# Patient Record
Sex: MALE | Race: WHITE | NOT HISPANIC OR LATINO | Employment: FULL TIME | URBAN - METROPOLITAN AREA
[De-identification: names, ages, dates, MRNs, and addresses within clinical notes are randomized per-mention and may not be internally consistent; named-entity substitution may affect disease eponyms.]

---

## 2019-10-11 ENCOUNTER — OFFICE VISIT (OUTPATIENT)
Dept: CARDIOLOGY | Facility: CLINIC | Age: 29
End: 2019-10-11
Payer: COMMERCIAL

## 2019-10-11 VITALS
WEIGHT: 207.25 LBS | DIASTOLIC BLOOD PRESSURE: 73 MMHG | SYSTOLIC BLOOD PRESSURE: 118 MMHG | BODY MASS INDEX: 24.47 KG/M2 | HEART RATE: 104 BPM | HEIGHT: 77 IN

## 2019-10-11 DIAGNOSIS — R00.2 PALPITATION: ICD-10-CM

## 2019-10-11 PROCEDURE — 93000 ELECTROCARDIOGRAM COMPLETE: CPT | Mod: S$GLB,,, | Performed by: INTERNAL MEDICINE

## 2019-10-11 PROCEDURE — 99243 PR OFFICE CONSULTATION,LEVEL III: ICD-10-PCS | Mod: S$GLB,,, | Performed by: INTERNAL MEDICINE

## 2019-10-11 PROCEDURE — 99243 OFF/OP CNSLTJ NEW/EST LOW 30: CPT | Mod: S$GLB,,, | Performed by: INTERNAL MEDICINE

## 2019-10-11 PROCEDURE — 93000 EKG 12-LEAD: ICD-10-PCS | Mod: S$GLB,,, | Performed by: INTERNAL MEDICINE

## 2019-10-11 PROCEDURE — 99999 PR PBB SHADOW E&M-NEW PATIENT-LVL III: CPT | Mod: PBBFAC,,, | Performed by: INTERNAL MEDICINE

## 2019-10-11 PROCEDURE — 99999 PR PBB SHADOW E&M-NEW PATIENT-LVL III: ICD-10-PCS | Mod: PBBFAC,,, | Performed by: INTERNAL MEDICINE

## 2019-10-11 NOTE — LETTER
October 11, 2019      Anne Woodruff MD  [address] [facility] [address]           [facility] - Cardiology  [address]  [place] [address]  Phone: [phone]          Patient: Volodymyr Pacheco   MR Number: [id number]   YOB: 1990   Date of Visit: 10/11/2019       Dear Dr. Anne Woodruff:    Thank you for referring Volodymyr Pacheco to me for evaluation. Attached you will find relevant portions of my assessment and plan of care.    If you have questions, please do not hesitate to call me. I look forward to following Volodymyr Pacheco along with you.    Sincerely,    Sourav Shi MD    Enclosure  CC:  No Recipients    If you would like to receive this communication electronically, please contact [email or web address] or [phone] to request more information on [employer] Link access.    For providers and/or their staff who would like to refer a patient to [facility], please contact us through our one-stop-shop provider referral line, [facility], at [phone].    If you feel you have received this communication in error or would no longer like to receive these types of communications, please e-mail [email or web address]

## 2019-10-11 NOTE — PROGRESS NOTES
"Subjective:    Patient ID:  Volodymyr Pacheco is a 29 y.o. male who presents for evaluation of Palpitations      HPI   The patient is a 29 year old male was noted to have "irregular rhythm"  on her exam. He has noted occasional heart pounding with occasional skip.He has no risk factors , has been a  in the past but does not exercise as much now. He finds work stressful at times and occasional assoicated insomnia. No snoring. Today's EKG reveals mild sinus arrhythmia with no ectopy        The patient is a 29 year old female referred by Dr Saini for evaluation of palpitations.  No results found for: NA, K, CL, CO2, BUN, CREATININE, GLU, HGBA1C, MG, AST, ALT, ALBUMIN, PROT, BILITOT, WBC, HGB, HCT, MCV, PLT, INR, PSA, TSH      No results found for: CHOL, HDL, TRIG    No results found for: LDLCALC    History reviewed. No pertinent past medical history.  No current outpatient medications on file.          Review of Systems   Constitution: Negative for decreased appetite, diaphoresis, fever, malaise/fatigue, weight gain and weight loss.   HENT: Negative for congestion, ear discharge, ear pain and nosebleeds.    Eyes: Negative for blurred vision, double vision and visual disturbance.   Cardiovascular: Negative for chest pain, claudication, cyanosis, dyspnea on exertion, irregular heartbeat, leg swelling, near-syncope, orthopnea, palpitations, paroxysmal nocturnal dyspnea and syncope.   Respiratory: Negative for cough, hemoptysis, shortness of breath, sleep disturbances due to breathing, snoring, sputum production and wheezing.    Endocrine: Negative for polydipsia, polyphagia and polyuria.   Hematologic/Lymphatic: Negative for adenopathy and bleeding problem. Does not bruise/bleed easily.   Skin: Negative for color change, nail changes, poor wound healing and rash.   Musculoskeletal: Negative for muscle cramps and muscle weakness.   Gastrointestinal: Negative for abdominal pain, anorexia, change in bowel habit, " "hematochezia, nausea and vomiting.   Genitourinary: Negative for dysuria, frequency and hematuria.   Neurological: Negative for brief paralysis, difficulty with concentration, excessive daytime sleepiness, dizziness, focal weakness, headaches, light-headedness, seizures, vertigo and weakness.   Psychiatric/Behavioral: Negative for altered mental status and depression.   Allergic/Immunologic: Negative for persistent infections.        Objective:/73   Pulse 104   Ht 6' 5" (1.956 m)   Wt 94 kg (207 lb 3.7 oz)   BMI 24.57 kg/m²             Physical Exam   Constitutional: He is oriented to person, place, and time. He appears well-developed and well-nourished.   HENT:   Head: Normocephalic.   Right Ear: External ear normal.   Left Ear: External ear normal.   Nose: Nose normal.   Inspection of lips, teeth and gums normal   Eyes: Pupils are equal, round, and reactive to light. EOM are normal. No scleral icterus.   Neck: Normal range of motion. Neck supple. No JVD present. No tracheal deviation present. No thyromegaly present.   Cardiovascular: Normal rate, regular rhythm and intact distal pulses. Exam reveals no gallop and no friction rub.   No murmur heard.  Pulses:       Carotid pulses are 2+ on the right side, and 2+ on the left side.       Dorsalis pedis pulses are 2+ on the right side, and 2+ on the left side.        Posterior tibial pulses are 2+ on the right side, and 2+ on the left side.   Pulmonary/Chest: Effort normal and breath sounds normal.   Abdominal: Bowel sounds are normal. He exhibits no distension. There is no hepatosplenomegaly. There is no tenderness. There is no guarding.   Musculoskeletal: Normal range of motion. He exhibits no edema or tenderness.   Lymphadenopathy:   Palpation of neck and groin lymph nodes normal   Neurological: He is alert and oriented to person, place, and time. No cranial nerve deficit. He exhibits normal muscle tone. Coordination normal.   Skin: Skin is dry. "   Palpation of skin normal   Psychiatric: His behavior is normal. Judgment and thought content normal.         Assessment:       1. Palpitation         Plan:       Volodymyr was seen today for palpitations.    Diagnoses and all orders for this visit:    Palpitation - sinus arrythmia  Reassurance  No further evaluation at this time

## 2019-10-14 DIAGNOSIS — R00.2 PALPITATION: Primary | ICD-10-CM

## 2021-05-09 ENCOUNTER — HOSPITAL ENCOUNTER (EMERGENCY)
Facility: OTHER | Age: 31
Discharge: HOME | End: 2021-05-09
Attending: EMERGENCY MEDICINE
Payer: COMMERCIAL

## 2021-05-09 ENCOUNTER — NURSE TRIAGE (OUTPATIENT)
Dept: ADMINISTRATIVE | Facility: CLINIC | Age: 31
End: 2021-05-09

## 2021-05-09 VITALS
BODY MASS INDEX: 25.03 KG/M2 | TEMPERATURE: 98 F | SYSTOLIC BLOOD PRESSURE: 145 MMHG | OXYGEN SATURATION: 98 % | HEART RATE: 90 BPM | HEIGHT: 77 IN | RESPIRATION RATE: 18 BRPM | WEIGHT: 212 LBS | DIASTOLIC BLOOD PRESSURE: 80 MMHG

## 2021-05-09 DIAGNOSIS — R52 PAIN: ICD-10-CM

## 2021-05-09 DIAGNOSIS — S99.921A INJURY OF RIGHT FOOT, INITIAL ENCOUNTER: Primary | ICD-10-CM

## 2021-05-09 DIAGNOSIS — T14.8XXA PUNCTURE WOUND: ICD-10-CM

## 2021-05-09 PROCEDURE — 63600175 PHARM REV CODE 636 W HCPCS: Performed by: EMERGENCY MEDICINE

## 2021-05-09 PROCEDURE — 90715 TDAP VACCINE 7 YRS/> IM: CPT | Performed by: EMERGENCY MEDICINE

## 2021-05-09 PROCEDURE — 25000003 PHARM REV CODE 250: Performed by: EMERGENCY MEDICINE

## 2021-05-09 PROCEDURE — 99284 EMERGENCY DEPT VISIT MOD MDM: CPT | Mod: 25

## 2021-05-09 PROCEDURE — 90471 IMMUNIZATION ADMIN: CPT | Performed by: EMERGENCY MEDICINE

## 2021-05-09 RX ORDER — LEVOFLOXACIN 750 MG/1
750 TABLET ORAL
Status: DISCONTINUED | OUTPATIENT
Start: 2021-05-09 | End: 2021-05-10 | Stop reason: HOSPADM

## 2021-05-09 RX ORDER — CEPHALEXIN 500 MG/1
500 CAPSULE ORAL 4 TIMES DAILY
Qty: 20 CAPSULE | Refills: 0 | Status: SHIPPED | OUTPATIENT
Start: 2021-05-09 | End: 2021-05-14

## 2021-05-09 RX ORDER — LEVOFLOXACIN 500 MG/1
750 TABLET, FILM COATED ORAL DAILY
Qty: 8 TABLET | Refills: 0 | Status: SHIPPED | OUTPATIENT
Start: 2021-05-09 | End: 2021-05-14

## 2021-05-09 RX ORDER — BACITRACIN ZINC 500 UNIT/G
OINTMENT (GRAM) TOPICAL 2 TIMES DAILY
Qty: 30 G | Refills: 0 | Status: SHIPPED | OUTPATIENT
Start: 2021-05-09

## 2021-05-09 RX ADMIN — BACITRACIN ZINC NEOMYCIN SULFATE POLYMYXIN B SULFATE: 400; 3.5; 5 OINTMENT TOPICAL at 10:05

## 2021-05-09 RX ADMIN — CLOSTRIDIUM TETANI TOXOID ANTIGEN (FORMALDEHYDE INACTIVATED), CORYNEBACTERIUM DIPHTHERIAE TOXOID ANTIGEN (FORMALDEHYDE INACTIVATED), BORDETELLA PERTUSSIS TOXOID ANTIGEN (GLUTARALDEHYDE INACTIVATED), BORDETELLA PERTUSSIS FILAMENTOUS HEMAGGLUTININ ANTIGEN (FORMALDEHYDE INACTIVATED), BORDETELLA PERTUSSIS PERTACTIN ANTIGEN, AND BORDETELLA PERTUSSIS FIMBRIAE 2/3 ANTIGEN 0.5 ML: 5; 2; 2.5; 5; 3; 5 INJECTION, SUSPENSION INTRAMUSCULAR at 10:05

## 2021-07-01 ENCOUNTER — OFFICE VISIT (OUTPATIENT)
Dept: OTOLARYNGOLOGY | Facility: CLINIC | Age: 31
End: 2021-07-01
Payer: COMMERCIAL

## 2021-07-01 VITALS
HEART RATE: 78 BPM | TEMPERATURE: 98 F | WEIGHT: 215.63 LBS | DIASTOLIC BLOOD PRESSURE: 97 MMHG | SYSTOLIC BLOOD PRESSURE: 159 MMHG | BODY MASS INDEX: 25.57 KG/M2

## 2021-07-01 DIAGNOSIS — B07.9 VIRAL WARTS, UNSPECIFIED TYPE: Primary | ICD-10-CM

## 2021-07-01 PROCEDURE — 3008F PR BODY MASS INDEX (BMI) DOCUMENTED: ICD-10-PCS | Mod: CPTII,S$GLB,, | Performed by: OTOLARYNGOLOGY

## 2021-07-01 PROCEDURE — 99204 OFFICE O/P NEW MOD 45 MIN: CPT | Mod: S$GLB,,, | Performed by: OTOLARYNGOLOGY

## 2021-07-01 PROCEDURE — 3008F BODY MASS INDEX DOCD: CPT | Mod: CPTII,S$GLB,, | Performed by: OTOLARYNGOLOGY

## 2021-07-01 PROCEDURE — 1126F AMNT PAIN NOTED NONE PRSNT: CPT | Mod: S$GLB,,, | Performed by: OTOLARYNGOLOGY

## 2021-07-01 PROCEDURE — 1126F PR PAIN SEVERITY QUANTIFIED, NO PAIN PRESENT: ICD-10-PCS | Mod: S$GLB,,, | Performed by: OTOLARYNGOLOGY

## 2021-07-01 PROCEDURE — 99204 PR OFFICE/OUTPT VISIT, NEW, LEVL IV, 45-59 MIN: ICD-10-PCS | Mod: S$GLB,,, | Performed by: OTOLARYNGOLOGY

## 2021-07-02 ENCOUNTER — TELEPHONE (OUTPATIENT)
Dept: OTOLARYNGOLOGY | Facility: CLINIC | Age: 31
End: 2021-07-02

## 2021-07-23 ENCOUNTER — PROCEDURE VISIT (OUTPATIENT)
Dept: OTOLARYNGOLOGY | Facility: CLINIC | Age: 31
End: 2021-07-23
Payer: COMMERCIAL

## 2021-07-23 VITALS
BODY MASS INDEX: 25.16 KG/M2 | TEMPERATURE: 98 F | WEIGHT: 212.19 LBS | SYSTOLIC BLOOD PRESSURE: 150 MMHG | DIASTOLIC BLOOD PRESSURE: 78 MMHG | HEART RATE: 93 BPM

## 2021-07-23 DIAGNOSIS — B07.9 VIRAL WARTS, UNSPECIFIED TYPE: Primary | ICD-10-CM

## 2021-07-23 PROCEDURE — 88305 TISSUE EXAM BY PATHOLOGIST: CPT | Performed by: PATHOLOGY

## 2021-07-23 PROCEDURE — 88342 IMHCHEM/IMCYTCHM 1ST ANTB: CPT | Mod: 26,,, | Performed by: PATHOLOGY

## 2021-07-23 PROCEDURE — 30117 REMOVAL OF INTRANASAL LESION: CPT | Mod: S$GLB,,, | Performed by: OTOLARYNGOLOGY

## 2021-07-23 PROCEDURE — 30117 PR EXCIS/DEST INTRANAS LESION; INT APP: ICD-10-PCS | Mod: S$GLB,,, | Performed by: OTOLARYNGOLOGY

## 2021-07-23 PROCEDURE — 88305 TISSUE EXAM BY PATHOLOGIST: CPT | Mod: 26,,, | Performed by: PATHOLOGY

## 2021-07-23 PROCEDURE — 88305 TISSUE EXAM BY PATHOLOGIST: ICD-10-PCS | Mod: 26,,, | Performed by: PATHOLOGY

## 2021-07-23 PROCEDURE — 88342 CHG IMMUNOCYTOCHEMISTRY: ICD-10-PCS | Mod: 26,,, | Performed by: PATHOLOGY

## 2021-07-23 PROCEDURE — 88342 IMHCHEM/IMCYTCHM 1ST ANTB: CPT | Performed by: PATHOLOGY

## 2021-07-28 ENCOUNTER — TELEPHONE (OUTPATIENT)
Dept: OTOLARYNGOLOGY | Facility: CLINIC | Age: 31
End: 2021-07-28

## 2021-07-29 LAB
FINAL PATHOLOGIC DIAGNOSIS: NORMAL
GROSS: NORMAL
Lab: NORMAL
MICROSCOPIC EXAM: NORMAL

## 2021-08-03 ENCOUNTER — OFFICE VISIT (OUTPATIENT)
Dept: OTOLARYNGOLOGY | Facility: CLINIC | Age: 31
End: 2021-08-03
Payer: COMMERCIAL

## 2021-08-03 VITALS
DIASTOLIC BLOOD PRESSURE: 87 MMHG | TEMPERATURE: 98 F | SYSTOLIC BLOOD PRESSURE: 132 MMHG | BODY MASS INDEX: 26.04 KG/M2 | WEIGHT: 219.63 LBS | HEART RATE: 64 BPM

## 2021-08-03 DIAGNOSIS — B07.9 VIRAL WARTS, UNSPECIFIED TYPE: Primary | ICD-10-CM

## 2021-08-03 PROCEDURE — 3008F PR BODY MASS INDEX (BMI) DOCUMENTED: ICD-10-PCS | Mod: CPTII,S$GLB,, | Performed by: OTOLARYNGOLOGY

## 2021-08-03 PROCEDURE — 99024 POSTOP FOLLOW-UP VISIT: CPT | Mod: S$GLB,,, | Performed by: OTOLARYNGOLOGY

## 2021-08-03 PROCEDURE — 1126F PR PAIN SEVERITY QUANTIFIED, NO PAIN PRESENT: ICD-10-PCS | Mod: CPTII,S$GLB,, | Performed by: OTOLARYNGOLOGY

## 2021-08-03 PROCEDURE — 1160F PR REVIEW ALL MEDS BY PRESCRIBER/CLIN PHARMACIST DOCUMENTED: ICD-10-PCS | Mod: CPTII,S$GLB,, | Performed by: OTOLARYNGOLOGY

## 2021-08-03 PROCEDURE — 3079F DIAST BP 80-89 MM HG: CPT | Mod: CPTII,S$GLB,, | Performed by: OTOLARYNGOLOGY

## 2021-08-03 PROCEDURE — 3079F PR MOST RECENT DIASTOLIC BLOOD PRESSURE 80-89 MM HG: ICD-10-PCS | Mod: CPTII,S$GLB,, | Performed by: OTOLARYNGOLOGY

## 2021-08-03 PROCEDURE — 1159F PR MEDICATION LIST DOCUMENTED IN MEDICAL RECORD: ICD-10-PCS | Mod: CPTII,S$GLB,, | Performed by: OTOLARYNGOLOGY

## 2021-08-03 PROCEDURE — 3008F BODY MASS INDEX DOCD: CPT | Mod: CPTII,S$GLB,, | Performed by: OTOLARYNGOLOGY

## 2021-08-03 PROCEDURE — 1160F RVW MEDS BY RX/DR IN RCRD: CPT | Mod: CPTII,S$GLB,, | Performed by: OTOLARYNGOLOGY

## 2021-08-03 PROCEDURE — 3075F PR MOST RECENT SYSTOLIC BLOOD PRESS GE 130-139MM HG: ICD-10-PCS | Mod: CPTII,S$GLB,, | Performed by: OTOLARYNGOLOGY

## 2021-08-03 PROCEDURE — 3075F SYST BP GE 130 - 139MM HG: CPT | Mod: CPTII,S$GLB,, | Performed by: OTOLARYNGOLOGY

## 2021-08-03 PROCEDURE — 1159F MED LIST DOCD IN RCRD: CPT | Mod: CPTII,S$GLB,, | Performed by: OTOLARYNGOLOGY

## 2021-08-03 PROCEDURE — 1126F AMNT PAIN NOTED NONE PRSNT: CPT | Mod: CPTII,S$GLB,, | Performed by: OTOLARYNGOLOGY

## 2021-08-03 PROCEDURE — 99024 PR POST-OP FOLLOW-UP VISIT: ICD-10-PCS | Mod: S$GLB,,, | Performed by: OTOLARYNGOLOGY

## 2022-02-15 DIAGNOSIS — M25.512 LEFT SHOULDER PAIN, UNSPECIFIED CHRONICITY: Primary | ICD-10-CM

## 2022-02-21 ENCOUNTER — HOSPITAL ENCOUNTER (OUTPATIENT)
Dept: RADIOLOGY | Facility: HOSPITAL | Age: 32
Discharge: HOME | End: 2022-02-21
Attending: PHYSICIAN ASSISTANT
Payer: COMMERCIAL

## 2022-02-21 ENCOUNTER — OFFICE VISIT (OUTPATIENT)
Dept: SPORTS MEDICINE | Facility: CLINIC | Age: 32
End: 2022-02-21
Payer: COMMERCIAL

## 2022-02-21 VITALS
DIASTOLIC BLOOD PRESSURE: 77 MMHG | HEART RATE: 87 BPM | SYSTOLIC BLOOD PRESSURE: 127 MMHG | WEIGHT: 221.56 LBS | HEIGHT: 77 IN | BODY MASS INDEX: 26.16 KG/M2

## 2022-02-21 DIAGNOSIS — M25.512 LEFT SHOULDER PAIN, UNSPECIFIED CHRONICITY: ICD-10-CM

## 2022-02-21 DIAGNOSIS — S43.005D SHOULDER DISLOCATION, LEFT, SUBSEQUENT ENCOUNTER: ICD-10-CM

## 2022-02-21 DIAGNOSIS — M25.512 ACUTE PAIN OF LEFT SHOULDER: Primary | ICD-10-CM

## 2022-02-21 PROCEDURE — 3008F BODY MASS INDEX DOCD: CPT | Mod: CPTII,S$GLB,, | Performed by: PHYSICIAN ASSISTANT

## 2022-02-21 PROCEDURE — 1159F MED LIST DOCD IN RCRD: CPT | Mod: CPTII,S$GLB,, | Performed by: PHYSICIAN ASSISTANT

## 2022-02-21 PROCEDURE — 1160F PR REVIEW ALL MEDS BY PRESCRIBER/CLIN PHARMACIST DOCUMENTED: ICD-10-PCS | Mod: CPTII,S$GLB,, | Performed by: PHYSICIAN ASSISTANT

## 2022-02-21 PROCEDURE — 99999 PR PBB SHADOW E&M-EST. PATIENT-LVL III: CPT | Mod: PBBFAC,,, | Performed by: PHYSICIAN ASSISTANT

## 2022-02-21 PROCEDURE — 73030 X-RAY EXAM OF SHOULDER: CPT | Mod: 26,LT,, | Performed by: RADIOLOGY

## 2022-02-21 PROCEDURE — 99204 OFFICE O/P NEW MOD 45 MIN: CPT | Mod: S$GLB,,, | Performed by: PHYSICIAN ASSISTANT

## 2022-02-21 PROCEDURE — 99204 PR OFFICE/OUTPT VISIT, NEW, LEVL IV, 45-59 MIN: ICD-10-PCS | Mod: S$GLB,,, | Performed by: PHYSICIAN ASSISTANT

## 2022-02-21 PROCEDURE — 73030 XR SHOULDER COMPLETE 2 OR MORE VIEWS LEFT: ICD-10-PCS | Mod: 26,LT,, | Performed by: RADIOLOGY

## 2022-02-21 PROCEDURE — 73030 X-RAY EXAM OF SHOULDER: CPT | Mod: TC,PN,LT

## 2022-02-21 PROCEDURE — 1159F PR MEDICATION LIST DOCUMENTED IN MEDICAL RECORD: ICD-10-PCS | Mod: CPTII,S$GLB,, | Performed by: PHYSICIAN ASSISTANT

## 2022-02-21 PROCEDURE — 99999 PR PBB SHADOW E&M-EST. PATIENT-LVL III: ICD-10-PCS | Mod: PBBFAC,,, | Performed by: PHYSICIAN ASSISTANT

## 2022-02-21 PROCEDURE — 3078F DIAST BP <80 MM HG: CPT | Mod: CPTII,S$GLB,, | Performed by: PHYSICIAN ASSISTANT

## 2022-02-21 PROCEDURE — 3074F SYST BP LT 130 MM HG: CPT | Mod: CPTII,S$GLB,, | Performed by: PHYSICIAN ASSISTANT

## 2022-02-21 PROCEDURE — 3078F PR MOST RECENT DIASTOLIC BLOOD PRESSURE < 80 MM HG: ICD-10-PCS | Mod: CPTII,S$GLB,, | Performed by: PHYSICIAN ASSISTANT

## 2022-02-21 PROCEDURE — 3008F PR BODY MASS INDEX (BMI) DOCUMENTED: ICD-10-PCS | Mod: CPTII,S$GLB,, | Performed by: PHYSICIAN ASSISTANT

## 2022-02-21 PROCEDURE — 3074F PR MOST RECENT SYSTOLIC BLOOD PRESSURE < 130 MM HG: ICD-10-PCS | Mod: CPTII,S$GLB,, | Performed by: PHYSICIAN ASSISTANT

## 2022-02-21 PROCEDURE — 1160F RVW MEDS BY RX/DR IN RCRD: CPT | Mod: CPTII,S$GLB,, | Performed by: PHYSICIAN ASSISTANT

## 2022-02-21 NOTE — PROGRESS NOTES
CC: LEFT shoulder pain    Patient is a 31-year-old male who presents today for initial evaluation of left shoulder pain.  Patient reports that last Tuesday February 15th he fell while snowboarding in [place], [place] and injured his left shoulder.  He felt and heard a loud pop and was unable to move his left shoulder then felt his shoulder pop back into place.  Later that day he was putting on his jacket and had his arm overhead when it dislocated again before self reducing.  He was evaluated by a physician in [place] and while he was at his appointment his shoulder dislocated and reduced a 3rd time.  He was provided with a sling for comfort and advised to follow-up with orthopedics when he returned home.  Since then, he has had increased soreness of the left shoulder that is worse with overhead activity, reaching, and trying to sleep on his left side.  He also endorses significant weakness and apprehension to move the shoulder.  There was a short period of numbness in the shoulder after dislocating it, however this has since resolved.  He is right-hand dominant and works at [employer] as a .  Prior to this injury, he had no episodes of instability.    He reports that the pain and weakness is worse with overhead activity. It also bothers him at night.    Is affecting ADLs.  Pain is 9/10 at it's worst.      No past medical history on file.    No past surgical history on file.    No family history on file.      Current Outpatient Medications:     bacitracin 500 unit/gram Oint, Apply topically 2 (two) times daily., Disp: 30 g, Rfl: 0    Review of patient's allergies indicates:   Allergen Reactions    Sulfa (sulfonamide antibiotics)           REVIEW OF SYSTEMS:  Constitution: Negative. Negative for chills, fever and night sweats.   HENT: Negative for congestion and headaches.    Eyes: Negative for blurred vision, left vision loss and right vision loss.   Cardiovascular: Negative for chest pain and syncope.    Respiratory: Negative for cough and shortness of breath.    Endocrine: Negative for polydipsia, polyphagia and polyuria.   Hematologic/Lymphatic: Negative for bleeding problem. Does not bruise/bleed easily.   Skin: Negative for dry skin, itching and rash.   Musculoskeletal: Negative for falls.  Positive for left shoulder pain and muscle weakness.   Gastrointestinal: Negative for abdominal pain and bowel incontinence.   Genitourinary: Negative for bladder incontinence and nocturia.   Neurological: Negative for disturbances in coordination, loss of balance and seizures.   Psychiatric/Behavioral: Negative for depression. The patient does not have insomnia.    Allergic/Immunologic: Negative for hives and persistent infections.      PHYSICAL EXAMINATION:  Vitals:  There were no vitals taken for this visit.   General: The patient is alert and oriented x 3.  Mood is pleasant.  Observation of ears, eyes and nose reveal no gross abnormalities.  No labored breathing observed.  Gait is coordinated. Patient can toe walk and heel walk without difficulty.      LEFT Shoulder / Upper Extremity Exam    OBSERVATION:     Swelling  none  Deformity  none   Discoloration  none   Scapular winging none   Scars   none  Atrophy  none    TENDERNESS / CREPITUS (T/C):          T/C      T/C   Clavicle   -/-  SUPRAspinatus    +/-     AC Jt.    -/-  INFRAspinatus  -/-    SC Jt.    -/-  Deltoid    -/-      G. Tuberosity  +/-  LH BICEP groove  +/-   Acromion:  -/-  Midline Neck   -/-     Scapular Spine -/-  Trapezium   -/-   SMA Scapula  -/-  GH jt. line - post  +/-     Scapulothoracic  -/-         ROM: (* = with pain)  Right shoulder   Left shoulder        AROM (PROM)   AROM (PROM)   FE    170° (175°)     70°* (90°*)     ER at 0°    60°  (65°)    20°*  (30°*)   ER at 90° ABD  90°  (90°)    NA  (30°*)   IR at 90°  ABD   NA  (40°)     NA  (20°*)      IR (spine level)   T10     Sacrum*    STRENGTH: (* = with pain) Right shoulder   Left shoulder     SCAPTION   5/5    3/5*    IR    5/5    4/5*   ER    5/5 4/5*   BICEPS   5/5 4/5*   Deltoid    5/5 4/5*     SIGNS:  Painful side       NEER   +    OROMAINES  +    LAGUNA   +    SPEEDS  +     DROP ARM   -   BELLY PRESS +   Superior escape none    LIFT-OFF  deferred   X-Body ADD    +    MOVING VALGUS neg        STABILITY TESTING    Right shoulder   Left shoulder    Translation     Anterior  up face     up face    Posterior  up face    up face    Sulcus   < 10mm    < 10 mm     Signs   Apprehension   neg      +       Relocation   no change     +      Jerk test  neg     +    EXTREMITY NEURO-VASCULAR EXAM:    Sensation grossly intact to light touch all dermatomal regions.    DTR 2+ Biceps, Triceps, BR and Negative Warrens sign   Grossly intact motor function at Elbow, Wrist and Hand   Distal pulses radial and ulnar 2+, brisk cap refill, symmetric.      NECK:  Painless FROM and spinous processes non-tender. Negative Spurlings sign.      OTHER FINDINGS:  - scapular dyskinesia    XRAYS left shoulder (2/21/2022):  No fracture dislocation bone destruction seen.  No acute trauma seen.  There may be remote trauma of the inferior glenoid.          ASSESSMENT:     Left shoulder pain  Left shoulder dislocation, subsequent encounter      PLAN:      1. I made the decision to obtain old records of the patient including previous notes and imaging. New imaging was ordered today of the extremity or extremities evaluated. I independently reviewed and interpreted the radiographs and/or MRIs today as well as prior imaging, if available.    2. We discussed at length different treatment options including conservative vs surgical management. These include anti-inflammatories, acetaminophen, rest, ice, heat, formal physical therapy including strengthening and stretching exercises, home exercise programs, dry needling, and finally surgical intervention.      3. Orders placed for MRI arthrogram of left shoulder to evaluate for  possible labral tear and intra-articular loose body.    4. Patient may wear sling as needed for comfort.  Instructed patient to perform pendulums and progressive mobility as tolerated to avoid stiffness.    5. Advised patient to take over-the-counter anti-inflammatories/acetaminophen as needed for pain.    6. Follow up in clinic with MRI arthrogram results.      All questions were answered, patient will contact us for questions or concerns in the interim.      Medical Dictation software was used during the dictation of portions or the entirety of this medical record.  Phonetic or grammatic errors may exist due to the use of this software. For clarification, refer to the author of the document.

## 2022-03-07 ENCOUNTER — HOSPITAL ENCOUNTER (OUTPATIENT)
Dept: RADIOLOGY | Facility: HOSPITAL | Age: 32
Discharge: HOME | End: 2022-03-07
Attending: PHYSICIAN ASSISTANT
Payer: COMMERCIAL

## 2022-03-07 DIAGNOSIS — M25.512 ACUTE PAIN OF LEFT SHOULDER: ICD-10-CM

## 2022-03-07 DIAGNOSIS — S43.005D SHOULDER DISLOCATION, LEFT, SUBSEQUENT ENCOUNTER: ICD-10-CM

## 2022-03-07 PROCEDURE — 25000003 PHARM REV CODE 250: Performed by: PHYSICIAN ASSISTANT

## 2022-03-07 PROCEDURE — 73040 CONTRAST X-RAY OF SHOULDER: CPT | Mod: TC,LT

## 2022-03-07 PROCEDURE — 23350 XR ARTHROGRAM SHOULDER LEFT WITH MRI TO FOLLOW: ICD-10-PCS | Mod: LT,,, | Performed by: RADIOLOGY

## 2022-03-07 PROCEDURE — 23350 INJECTION FOR SHOULDER X-RAY: CPT | Mod: LT,,, | Performed by: RADIOLOGY

## 2022-03-07 PROCEDURE — 25500020 PHARM REV CODE 255: Performed by: PHYSICIAN ASSISTANT

## 2022-03-07 PROCEDURE — 73040 CONTRAST X-RAY OF SHOULDER: CPT | Mod: 26,LT,, | Performed by: RADIOLOGY

## 2022-03-07 PROCEDURE — 73222 MRI JOINT UPR EXTREM W/DYE: CPT | Mod: TC,LT

## 2022-03-07 PROCEDURE — 73040 XR ARTHROGRAM SHOULDER LEFT WITH MRI TO FOLLOW: ICD-10-PCS | Mod: 26,LT,, | Performed by: RADIOLOGY

## 2022-03-07 PROCEDURE — 73222 MRI ARTHROGRAM SHOULDER WITH CONTRAST LEFT: ICD-10-PCS | Mod: 26,LT,, | Performed by: RADIOLOGY

## 2022-03-07 PROCEDURE — A9585 GADOBUTROL INJECTION: HCPCS | Performed by: PHYSICIAN ASSISTANT

## 2022-03-07 PROCEDURE — 73222 MRI JOINT UPR EXTREM W/DYE: CPT | Mod: 26,LT,, | Performed by: RADIOLOGY

## 2022-03-07 RX ORDER — GADOBUTROL 604.72 MG/ML
7 INJECTION INTRAVENOUS
Status: COMPLETED | OUTPATIENT
Start: 2022-03-07 | End: 2022-03-07

## 2022-03-07 RX ORDER — LIDOCAINE HYDROCHLORIDE 10 MG/ML
13 INJECTION INFILTRATION; PERINEURAL ONCE
Status: COMPLETED | OUTPATIENT
Start: 2022-03-07 | End: 2022-03-07

## 2022-03-07 RX ADMIN — LIDOCAINE HYDROCHLORIDE 13 ML: 10 INJECTION, SOLUTION INFILTRATION; PERINEURAL at 10:03

## 2022-03-07 RX ADMIN — IOHEXOL 3 ML: 300 INJECTION, SOLUTION INTRAVENOUS at 10:03

## 2022-03-07 RX ADMIN — GADOBUTROL 7 ML: 604.72 INJECTION INTRAVENOUS at 10:03

## 2022-03-14 ENCOUNTER — OFFICE VISIT (OUTPATIENT)
Dept: SPORTS MEDICINE | Facility: CLINIC | Age: 32
End: 2022-03-14
Payer: COMMERCIAL

## 2022-03-14 VITALS
HEIGHT: 77 IN | DIASTOLIC BLOOD PRESSURE: 89 MMHG | WEIGHT: 215.19 LBS | BODY MASS INDEX: 25.41 KG/M2 | HEART RATE: 76 BPM | SYSTOLIC BLOOD PRESSURE: 135 MMHG

## 2022-03-14 DIAGNOSIS — M25.512 ACUTE PAIN OF LEFT SHOULDER: Primary | ICD-10-CM

## 2022-03-14 DIAGNOSIS — S42.142A GLENOID FRACTURE OF SHOULDER, LEFT, CLOSED, INITIAL ENCOUNTER: ICD-10-CM

## 2022-03-14 DIAGNOSIS — S43.492A BANKART LESION OF LEFT SHOULDER, INITIAL ENCOUNTER: ICD-10-CM

## 2022-03-14 DIAGNOSIS — S42.152A GLENOID FRACTURE OF SHOULDER, LEFT, CLOSED, INITIAL ENCOUNTER: ICD-10-CM

## 2022-03-14 DIAGNOSIS — M24.012 LOOSE BODY IN LEFT SHOULDER: ICD-10-CM

## 2022-03-14 PROCEDURE — 3008F PR BODY MASS INDEX (BMI) DOCUMENTED: ICD-10-PCS | Mod: CPTII,S$GLB,, | Performed by: PHYSICIAN ASSISTANT

## 2022-03-14 PROCEDURE — 1160F RVW MEDS BY RX/DR IN RCRD: CPT | Mod: CPTII,S$GLB,, | Performed by: PHYSICIAN ASSISTANT

## 2022-03-14 PROCEDURE — 3075F PR MOST RECENT SYSTOLIC BLOOD PRESS GE 130-139MM HG: ICD-10-PCS | Mod: CPTII,S$GLB,, | Performed by: PHYSICIAN ASSISTANT

## 2022-03-14 PROCEDURE — 99999 PR PBB SHADOW E&M-EST. PATIENT-LVL III: ICD-10-PCS | Mod: PBBFAC,,, | Performed by: PHYSICIAN ASSISTANT

## 2022-03-14 PROCEDURE — 99214 PR OFFICE/OUTPT VISIT, EST, LEVL IV, 30-39 MIN: ICD-10-PCS | Mod: S$GLB,,, | Performed by: PHYSICIAN ASSISTANT

## 2022-03-14 PROCEDURE — 3075F SYST BP GE 130 - 139MM HG: CPT | Mod: CPTII,S$GLB,, | Performed by: PHYSICIAN ASSISTANT

## 2022-03-14 PROCEDURE — 3079F DIAST BP 80-89 MM HG: CPT | Mod: CPTII,S$GLB,, | Performed by: PHYSICIAN ASSISTANT

## 2022-03-14 PROCEDURE — 99999 PR PBB SHADOW E&M-EST. PATIENT-LVL III: CPT | Mod: PBBFAC,,, | Performed by: PHYSICIAN ASSISTANT

## 2022-03-14 PROCEDURE — 3008F BODY MASS INDEX DOCD: CPT | Mod: CPTII,S$GLB,, | Performed by: PHYSICIAN ASSISTANT

## 2022-03-14 PROCEDURE — 99214 OFFICE O/P EST MOD 30 MIN: CPT | Mod: S$GLB,,, | Performed by: PHYSICIAN ASSISTANT

## 2022-03-14 PROCEDURE — 3079F PR MOST RECENT DIASTOLIC BLOOD PRESSURE 80-89 MM HG: ICD-10-PCS | Mod: CPTII,S$GLB,, | Performed by: PHYSICIAN ASSISTANT

## 2022-03-14 PROCEDURE — 1160F PR REVIEW ALL MEDS BY PRESCRIBER/CLIN PHARMACIST DOCUMENTED: ICD-10-PCS | Mod: CPTII,S$GLB,, | Performed by: PHYSICIAN ASSISTANT

## 2022-03-14 PROCEDURE — 1159F MED LIST DOCD IN RCRD: CPT | Mod: CPTII,S$GLB,, | Performed by: PHYSICIAN ASSISTANT

## 2022-03-14 PROCEDURE — 1159F PR MEDICATION LIST DOCUMENTED IN MEDICAL RECORD: ICD-10-PCS | Mod: CPTII,S$GLB,, | Performed by: PHYSICIAN ASSISTANT

## 2022-03-14 NOTE — PROGRESS NOTES
CC: LEFT shoulder pain    Patient is a 31-year-old male who presents today for follow-up evaluation of left shoulder pain.  He sustained a fall onto his left shoulder while skiing approximately 4 weeks ago which resulted in a shoulder dislocation.  Since then, he has had multiple instability events, for total events.  He states that a few days ago he was leaning onto his left elbow to reach for something when he again dislocated his left shoulder and self reduced.  He continues to have pain with movement of the right shoulder, especially reaching behind his back reaching out to the side, and overhead activity.  He endorses associated weakness of the left side as well.  He has been resting, icing the shoulder and taking over-the-counter anti-inflammatories as needed for pain.  He is here today to discuss MRI results and treatment options moving forward.    Previous HPI (2/21/2022):  Patient is a 31-year-old male who presents today for initial evaluation of left shoulder pain.  Patient reports that last Tuesday February 15th he fell while snowboarding in [place], [place] and injured his left shoulder.  He felt and heard a loud pop and was unable to move his left shoulder then felt his shoulder pop back into place.  Later that day he was putting on his jacket and had his arm overhead when it dislocated again before self reducing.  He was evaluated by a physician in [place] and while he was at his appointment his shoulder dislocated and reduced a 3rd time.  He was provided with a sling for comfort and advised to follow-up with orthopedics when he returned home.  Since then, he has had increased soreness of the left shoulder that is worse with overhead activity, reaching, and trying to sleep on his left side.  He also endorses significant weakness and apprehension to move the shoulder.  There was a short period of numbness in the shoulder after dislocating it, however this has since resolved.  He is right-hand dominant and  "works at [employer] as a .  Prior to this injury, he had no episodes of instability.    He reports that the pain and weakness is worse with overhead activity. It also bothers him at night.    Is affecting ADLs.  Pain is 9/10 at it's worst.      History reviewed. No pertinent past medical history.    History reviewed. No pertinent surgical history.    History reviewed. No pertinent family history.      Current Outpatient Medications:     bacitracin 500 unit/gram Oint, Apply topically 2 (two) times daily., Disp: 30 g, Rfl: 0    Review of patient's allergies indicates:   Allergen Reactions    Sulfa (sulfonamide antibiotics)           REVIEW OF SYSTEMS:  Constitution: Negative. Negative for chills, fever and night sweats.   HENT: Negative for congestion and headaches.    Eyes: Negative for blurred vision, left vision loss and right vision loss.   Cardiovascular: Negative for chest pain and syncope.   Respiratory: Negative for cough and shortness of breath.    Endocrine: Negative for polydipsia, polyphagia and polyuria.   Hematologic/Lymphatic: Negative for bleeding problem. Does not bruise/bleed easily.   Skin: Negative for dry skin, itching and rash.   Musculoskeletal: Negative for falls.  Positive for left shoulder pain and muscle weakness.   Gastrointestinal: Negative for abdominal pain and bowel incontinence.   Genitourinary: Negative for bladder incontinence and nocturia.   Neurological: Negative for disturbances in coordination, loss of balance and seizures.   Psychiatric/Behavioral: Negative for depression. The patient does not have insomnia.    Allergic/Immunologic: Negative for hives and persistent infections.      PHYSICAL EXAMINATION:  Vitals:  /89   Pulse 76   Ht 6' 5" (1.956 m)   Wt 97.6 kg (215 lb 2.7 oz)   BMI 25.52 kg/m²    General: The patient is alert and oriented x 3.  Mood is pleasant.  Observation of ears, eyes and nose reveal no gross abnormalities.  No labored breathing " observed.  Gait is coordinated. Patient can toe walk and heel walk without difficulty.      LEFT Shoulder / Upper Extremity Exam    OBSERVATION:     Swelling  none  Deformity  none   Discoloration  none   Scapular winging none   Scars   none  Atrophy  none    TENDERNESS / CREPITUS (T/C):          T/C      T/C   Clavicle   -/-  SUPRAspinatus    +/-     AC Jt.    -/-  INFRAspinatus  -/-    SC Jt.    -/-  Deltoid    -/-      G. Tuberosity  +/-  LH BICEP groove  +/-   Acromion:  -/-  Midline Neck   -/-     Scapular Spine -/-  Trapezium   -/-   SMA Scapula  -/-  GH jt. line - post  +/-     Scapulothoracic  -/-         ROM: (* = with pain)  Right shoulder   Left shoulder        AROM (PROM)   AROM (PROM)   FE    170° (175°)     130°* (140°*)     ER at 0°    60°  (65°)    40°*  (45°*)   ER at 90° ABD  90°  (90°)    NA  (30°*)   IR at 90°  ABD   NA  (40°)     NA  (20°*)      IR (spine level)   T10     L4*    STRENGTH: (* = with pain) Right shoulder   Left shoulder    SCAPTION   5/5    3/5*    IR    5/5    4/5*   ER    5/5    4/5*   BICEPS   5/5    4/5*   Deltoid    5/5    4/5*     SIGNS:  Painful side       NEER   +    OROMAINES  +    LAGUNA   +    SPEEDS  +     DROP ARM   -   BELLY PRESS +   Superior escape none    LIFT-OFF  deferred   X-Body ADD    +    MOVING VALGUS neg        STABILITY TESTING    Right shoulder   Left shoulder    Translation     Anterior  up face     up face    Posterior  up face    up face    Sulcus   < 10mm    > 10 mm     Signs   Apprehension   neg      +       Relocation   no change     +      Jerk test  neg     +    EXTREMITY NEURO-VASCULAR EXAM:    Sensation grossly intact to light touch all dermatomal regions.    DTR 2+ Biceps, Triceps, BR and Negative Warrens sign   Grossly intact motor function at Elbow, Wrist and Hand   Distal pulses radial and ulnar 2+, brisk cap refill, symmetric.      NECK:  Painless FROM and spinous processes non-tender. Negative Spurlings sign.      OTHER  FINDINGS:  - scapular dyskinesia    XRAYS left shoulder (2/21/2022):  No fracture dislocation bone destruction seen.  No acute trauma seen.  There may be remote trauma of the inferior glenoid.      MRI arthrogram left shoulder (3/7/2022):  Impression:     Previous anterior dislocation with residual mild Hill-Sachs deformity at the humeral head and significant osseous Bankart at the inferior anterior glenoid, please see details above.     The anterior inferior glenoid is torn with associated periosteal sleeve avulsion.      ASSESSMENT:     Left shoulder pain, s/p multiple instability events  Bankart lesion of left shoulder  Glenoid fracture, left  Intra-articular loose chondral body, left        PLAN:      1. I made the decision to obtain old records of the patient including previous notes and imaging. I independently reviewed and interpreted the radiographs and/or MRIs today as well as prior imaging. Reviewed MRI and radiograph results with patient in detail.    2. We discussed at length different treatment options including conservative vs surgical management. These include anti-inflammatories, acetaminophen, rest, ice, heat, formal physical therapy including strengthening and stretching exercises, home exercise programs, dry needling, and finally surgical intervention.      3. After discussion with patient of conservative versus operative treatment options for his shoulder, he is interested in proceeding with surgery.  We will discuss imaging with Dr. Goldstein to formulate a surgical plan which will likely include arthroscopic Bankart/labral repair, intra-articular loose body removal, possible open subpectoral biceps tenodesis, possible ORIF glenoid.    4. Will call patient to establish a surgical date and to set up further appointments.  We will possibly order a CT of left shoulder to further evaluate glenoid fracture for surgical planning.      All questions were answered, patient will contact us for questions  or concerns in the interim.      Medical Dictation software was used during the dictation of portions or the entirety of this medical record.  Phonetic or grammatic errors may exist due to the use of this software. For clarification, refer to the author of the document.

## 2022-03-15 DIAGNOSIS — S42.152A GLENOID FRACTURE OF SHOULDER, LEFT, CLOSED, INITIAL ENCOUNTER: Primary | ICD-10-CM

## 2022-03-15 DIAGNOSIS — S42.142A GLENOID FRACTURE OF SHOULDER, LEFT, CLOSED, INITIAL ENCOUNTER: Primary | ICD-10-CM

## 2022-03-22 ENCOUNTER — HOSPITAL ENCOUNTER (OUTPATIENT)
Dept: RADIOLOGY | Facility: HOSPITAL | Age: 32
Discharge: HOME | End: 2022-03-22
Attending: PHYSICIAN ASSISTANT
Payer: COMMERCIAL

## 2022-03-22 DIAGNOSIS — S42.152A GLENOID FRACTURE OF SHOULDER, LEFT, CLOSED, INITIAL ENCOUNTER: ICD-10-CM

## 2022-03-22 DIAGNOSIS — S42.142A GLENOID FRACTURE OF SHOULDER, LEFT, CLOSED, INITIAL ENCOUNTER: ICD-10-CM

## 2022-03-22 PROCEDURE — 73200 CT UPPER EXTREMITY W/O DYE: CPT | Mod: 26,LT,, | Performed by: RADIOLOGY

## 2022-03-22 PROCEDURE — 73200 CT SHOULDER WITHOUT CONTRAST LEFT: ICD-10-PCS | Mod: 26,LT,, | Performed by: RADIOLOGY

## 2022-03-22 PROCEDURE — 73200 CT UPPER EXTREMITY W/O DYE: CPT | Mod: TC,LT

## 2022-03-29 ENCOUNTER — OFFICE VISIT (OUTPATIENT)
Dept: SPORTS MEDICINE | Facility: CLINIC | Age: 32
End: 2022-03-29
Payer: COMMERCIAL

## 2022-03-29 VITALS
WEIGHT: 208 LBS | BODY MASS INDEX: 24.56 KG/M2 | HEART RATE: 76 BPM | HEIGHT: 77 IN | SYSTOLIC BLOOD PRESSURE: 128 MMHG | DIASTOLIC BLOOD PRESSURE: 80 MMHG

## 2022-03-29 DIAGNOSIS — S42.152A GLENOID FRACTURE OF SHOULDER, LEFT, CLOSED, INITIAL ENCOUNTER: Primary | ICD-10-CM

## 2022-03-29 DIAGNOSIS — S43.492A BANKART LESION OF LEFT SHOULDER, INITIAL ENCOUNTER: ICD-10-CM

## 2022-03-29 DIAGNOSIS — S42.142A GLENOID FRACTURE OF SHOULDER, LEFT, CLOSED, INITIAL ENCOUNTER: Primary | ICD-10-CM

## 2022-03-29 DIAGNOSIS — M25.512 LEFT SHOULDER PAIN, UNSPECIFIED CHRONICITY: ICD-10-CM

## 2022-03-29 DIAGNOSIS — Z01.818 PRE-OP TESTING: Primary | ICD-10-CM

## 2022-03-29 PROCEDURE — 3074F PR MOST RECENT SYSTOLIC BLOOD PRESSURE < 130 MM HG: ICD-10-PCS | Mod: CPTII,S$GLB,, | Performed by: ORTHOPAEDIC SURGERY

## 2022-03-29 PROCEDURE — 3008F PR BODY MASS INDEX (BMI) DOCUMENTED: ICD-10-PCS | Mod: CPTII,S$GLB,, | Performed by: ORTHOPAEDIC SURGERY

## 2022-03-29 PROCEDURE — 1159F PR MEDICATION LIST DOCUMENTED IN MEDICAL RECORD: ICD-10-PCS | Mod: CPTII,S$GLB,, | Performed by: ORTHOPAEDIC SURGERY

## 2022-03-29 PROCEDURE — 3079F DIAST BP 80-89 MM HG: CPT | Mod: CPTII,S$GLB,, | Performed by: ORTHOPAEDIC SURGERY

## 2022-03-29 PROCEDURE — 99999 PR PBB SHADOW E&M-EST. PATIENT-LVL III: CPT | Mod: PBBFAC,,, | Performed by: ORTHOPAEDIC SURGERY

## 2022-03-29 PROCEDURE — 99215 OFFICE O/P EST HI 40 MIN: CPT | Mod: 25,S$GLB,, | Performed by: ORTHOPAEDIC SURGERY

## 2022-03-29 PROCEDURE — 3008F BODY MASS INDEX DOCD: CPT | Mod: CPTII,S$GLB,, | Performed by: ORTHOPAEDIC SURGERY

## 2022-03-29 PROCEDURE — 99215 PR OFFICE/OUTPT VISIT, EST, LEVL V, 40-54 MIN: ICD-10-PCS | Mod: 25,S$GLB,, | Performed by: ORTHOPAEDIC SURGERY

## 2022-03-29 PROCEDURE — 3079F PR MOST RECENT DIASTOLIC BLOOD PRESSURE 80-89 MM HG: ICD-10-PCS | Mod: CPTII,S$GLB,, | Performed by: ORTHOPAEDIC SURGERY

## 2022-03-29 PROCEDURE — 99999 PR PBB SHADOW E&M-EST. PATIENT-LVL III: ICD-10-PCS | Mod: PBBFAC,,, | Performed by: ORTHOPAEDIC SURGERY

## 2022-03-29 PROCEDURE — 3074F SYST BP LT 130 MM HG: CPT | Mod: CPTII,S$GLB,, | Performed by: ORTHOPAEDIC SURGERY

## 2022-03-29 PROCEDURE — 1159F MED LIST DOCD IN RCRD: CPT | Mod: CPTII,S$GLB,, | Performed by: ORTHOPAEDIC SURGERY

## 2022-03-29 NOTE — PROGRESS NOTES
CC: LEFT shoulder pain  HPI:   Patient is sent to clinic for CT reviews. See History below.     Interval Hx:   Patient is a 31-year-old male who presents today for follow-up evaluation of left shoulder pain.  He sustained a fall onto his left shoulder while skiing approximately 4 weeks ago which resulted in a shoulder dislocation.  Since then, he has had multiple instability events, for total events.  He states that a few days ago he was leaning onto his left elbow to reach for something when he again dislocated his left shoulder and self reduced.  He continues to have pain with movement of the right shoulder, especially reaching behind his back reaching out to the side, and overhead activity.  He endorses associated weakness of the left side as well.  He has been resting, icing the shoulder and taking over-the-counter anti-inflammatories as needed for pain.  He is here today to discuss MRI results and treatment options moving forward.    Previous HPI (2/21/2022):  Patient is a 31-year-old male who presents today for initial evaluation of left shoulder pain.  Patient reports that last Tuesday February 15th he fell while snowboarding in [place], [place] and injured his left shoulder.  He felt and heard a loud pop and was unable to move his left shoulder then felt his shoulder pop back into place.  Later that day he was putting on his jacket and had his arm overhead when it dislocated again before self reducing.  He was evaluated by a physician in [place] and while he was at his appointment his shoulder dislocated and reduced a 3rd time.  He was provided with a sling for comfort and advised to follow-up with orthopedics when he returned home.  Since then, he has had increased soreness of the left shoulder that is worse with overhead activity, reaching, and trying to sleep on his left side.  He also endorses significant weakness and apprehension to move the shoulder.  There was a short period of numbness in the shoulder  "after dislocating it, however this has since resolved.  He is right-hand dominant and works at [employer] as a .  Prior to this injury, he had no episodes of instability.    He reports that the pain and weakness is worse with overhead activity. It also bothers him at night.    Is affecting ADLs.  Pain is 9/10 at it's worst.      History reviewed. No pertinent past medical history.    History reviewed. No pertinent surgical history.    History reviewed. No pertinent family history.      Current Outpatient Medications:     bacitracin 500 unit/gram Oint, Apply topically 2 (two) times daily. (Patient not taking: Reported on 3/29/2022), Disp: 30 g, Rfl: 0    Review of patient's allergies indicates:   Allergen Reactions    Sulfa (sulfonamide antibiotics)           REVIEW OF SYSTEMS:  Constitution: Negative. Negative for chills, fever and night sweats.   HENT: Negative for congestion and headaches.    Eyes: Negative for blurred vision, left vision loss and right vision loss.   Cardiovascular: Negative for chest pain and syncope.   Respiratory: Negative for cough and shortness of breath.    Endocrine: Negative for polydipsia, polyphagia and polyuria.   Hematologic/Lymphatic: Negative for bleeding problem. Does not bruise/bleed easily.   Skin: Negative for dry skin, itching and rash.   Musculoskeletal: Negative for falls.  Positive for left shoulder pain and muscle weakness.   Gastrointestinal: Negative for abdominal pain and bowel incontinence.   Genitourinary: Negative for bladder incontinence and nocturia.   Neurological: Negative for disturbances in coordination, loss of balance and seizures.   Psychiatric/Behavioral: Negative for depression. The patient does not have insomnia.    Allergic/Immunologic: Negative for hives and persistent infections.      PHYSICAL EXAMINATION:  Vitals:  /80   Pulse 76   Ht 6' 5" (1.956 m)   Wt 94.3 kg (208 lb)   BMI 24.67 kg/m²    General: The patient is alert and " oriented x 3.  Mood is pleasant.  Observation of ears, eyes and nose reveal no gross abnormalities.  No labored breathing observed.  Gait is coordinated. Patient can toe walk and heel walk without difficulty.      LEFT Shoulder / Upper Extremity Exam    OBSERVATION:     Swelling  none  Deformity  none   Discoloration  none   Scapular winging none   Scars   none  Atrophy  none    TENDERNESS / CREPITUS (T/C):          T/C      T/C   Clavicle   -/-  SUPRAspinatus    +/-     AC Jt.    -/-  INFRAspinatus  -/-    SC Jt.    -/-  Deltoid    -/-      G. Tuberosity  +/-  LH BICEP groove  +/-   Acromion:  -/-  Midline Neck   -/-     Scapular Spine -/-  Trapezium   -/-   SMA Scapula  -/-  GH jt. line - post  +/-     Scapulothoracic  -/-         ROM: (* = with pain)  Right shoulder   Left shoulder        AROM (PROM)   AROM (PROM)   FE    170° (175°)     130°* (140°*)     ER at 0°    60°  (65°)    40°*  (45°*)   ER at 90° ABD  90°  (90°)    NA  (30°*)   IR at 90°  ABD   NA  (40°)     NA  (20°*)      IR (spine level)   T10     L4*    STRENGTH: (* = with pain) Right shoulder   Left shoulder    SCAPTION   5/5    3/5*    IR    5/5    4/5*   ER    5/5    4/5*   BICEPS   5/5    4/5*   Deltoid    5/5    4/5*     SIGNS:  Painful side       NEER   +    OROAMINES  +    LAGUNA   +    SPEEDS  +     DROP ARM   -   BELLY PRESS +   Superior escape none    LIFT-OFF  deferred   X-Body ADD    +    MOVING VALGUS neg        STABILITY TESTING    Right shoulder   Left shoulder    Translation     Anterior  up face     up face    Posterior  up face    up face    Sulcus   < 10mm    > 10 mm     Signs   Apprehension   neg      +       Relocation   no change     +      Jerk test  neg     +    EXTREMITY NEURO-VASCULAR EXAM:    Sensation grossly intact to light touch all dermatomal regions.    DTR 2+ Biceps, Triceps, BR and Negative Warrens sign   Grossly intact motor function at Elbow, Wrist and Hand   Distal pulses radial and ulnar 2+, brisk cap  refill, symmetric.      NECK:  Painless FROM and spinous processes non-tender. Negative Spurlings sign.      OTHER FINDINGS:  - scapular dyskinesia    XRAYS left shoulder (2/21/2022):  No fracture dislocation bone destruction seen.  No acute trauma seen.  There may be remote trauma of the inferior glenoid.      MRI arthrogram left shoulder (3/7/2022):  Impression:     Previous anterior dislocation with residual mild Hill-Sachs deformity at the humeral head and significant osseous Bankart at the inferior anterior glenoid, please see details above.     The anterior inferior glenoid is torn with associated periosteal sleeve avulsion.     CT Shoulder Without Contrast Left  Narrative: EXAMINATION:  CT SHOULDER WITHOUT CONTRAST LEFT    CLINICAL HISTORY:  Glenoid fracture - surgical planning;  Displaced fracture of glenoid cavity of scapula, left shoulder, initial encounter for closed fracture    TECHNIQUE:  Axial 1.25-mm images of the left shoulder obtained without intravenous contrast.  Data submitted for coronal and sagittal reformats.    COMPARISON:  MR arthrogram shoulder 03/07/2022.  Shoulder radiograph 02/21/2022    FINDINGS:  Slightly displaced healing osseous Bankart lesion at the anteroinferior glenoid, resulting in approximately 30% glenoid bone loss (series 200 image 46).  Fracture fragment measures approximately 2.9 x 1.2 x 1.4 cm (series 200, image 48; series 201, image 63).  Slight irregularity of the superolateral humeral head, consistent with mild Hill-Sachs deformity.  Mild posterior decentering of the humeral head with respect to the glenoid, suggesting instability.  Type 1 flat acromial morphology.  Bony mineralization is normal.    Soft tissues are better evaluated on recent MR. Normal muscle bulk.  Impression: Exam obtained for surgical planning.    Constellation of findings consistent with recent anterior glenohumeral dislocation including:    *Healing, slightly displaced osseous Bankart with  approximately 30% glenoid bone loss  *Irregularity of the superolateral humeral head consistent with mild Hill-Sachs deformity.    Electronically signed by resident: Ronnie Muro  Date:    03/23/2022  Time:    07:47    Electronically signed by: Jermaine Ng MD  Date:    03/23/2022  Time:    10:15         ASSESSMENT:     Left shoulder pain, s/p multiple instability events  Bankart lesion of left shoulder  Glenoid fracture, left  Intra-articular loose chondral body, left        PLAN:        Treatment options were discussed with the patient about shoulder.  I reviewed the MRI images with his and what this means for his shoulder.    We discussed both non-operative and operative options for his shoulder and the risks and benefits of each. Time was given for questions to be asked and all concerns were answered.    He would like to go forward with surgery for his shoulder which I think is reasonable.  All specific risks and benefits were reviewed.    These risks include but are not limited to: bleeding, infection, scarring, re-tear of repair, irreparability of the tear, damage to neurovascular structures, damage to cartilage, stiffness, blood clots, pulmonary embolism, swelling, compartment syndrome, need for further surgery, and the risks of anesthesia.      He verbalized her understanding of these risks and wished to proceed with surgery.    Time was spent face-to-face with the patient during this encounter on counseling about treatment options including surgery and coordination of his care for preoperative visits, surgery and post-operative rehab.     The operative plan will be:    left   1. Attempted arthroscopic bankart labral repair and bony bankart repair  2. +/- ORIF glenoid fracture  Possible chondroplasty or debridement  3. Possible biceps tenotomy or open subpectoral biceps tenodesis  4. Possible rotator cuff repair versus debridement    Position: Lateral decubitus    Patient will not  need medical clearance  prior to the pre-operative appointment.    All questions were answered, patient will contact us for questions or concerns in the interim.    The total face-to-face encounter time with this patient was 40 minutes and greater than 50% of  the encounter time was spent counseling the patient and coordinating care. Significant time was also spent educating the patient, giving detail post-visit instructions, and discussing risks and benefits of treatment. Patient also had several specific questions that were answered during the visit today.             All questions were answered, patient will contact us for questions or concerns in the interim.      Medical Dictation software was used during the dictation of portions or the entirety of this medical record.  Phonetic or grammatic errors may exist due to the use of this software. For clarification, refer to the author of the document.

## 2022-03-30 DIAGNOSIS — M19.012 ARTHRITIS OF LEFT ACROMIOCLAVICULAR JOINT: ICD-10-CM

## 2022-03-30 DIAGNOSIS — S43.492A BANKART LESION OF LEFT SHOULDER, INITIAL ENCOUNTER: Primary | ICD-10-CM

## 2022-03-30 DIAGNOSIS — M75.22 BICEPS TENDINITIS OF LEFT UPPER EXTREMITY: ICD-10-CM

## 2022-03-31 ENCOUNTER — TELEPHONE (OUTPATIENT)
Dept: SPORTS MEDICINE | Facility: CLINIC | Age: 32
End: 2022-03-31
Payer: COMMERCIAL

## 2022-03-31 ENCOUNTER — PATIENT MESSAGE (OUTPATIENT)
Dept: PREADMISSION TESTING | Facility: HOSPITAL | Age: 32
End: 2022-03-31
Payer: COMMERCIAL

## 2022-03-31 DIAGNOSIS — S43.492A BANKART LESION OF LEFT SHOULDER, INITIAL ENCOUNTER: Primary | ICD-10-CM

## 2022-03-31 NOTE — ANESTHESIA PAT ROS NOTE
03/31/2022  Volodymyr Pacheco is a 31 y.o., male.  All information is gathered per Chart review via Epic system only.  Pre-op Assessment          Review of Systems  Social:  Non-Smoker, Social Alcohol Use       Planned Procedure: Procedure(s) (LRB):  ARTHROSCOPY, SHOULDER, WITH BANKART REPAIR (Left)  REPAIR, ROTATOR CUFF, ARTHROSCOPIC (Left)  FIXATION, TENDON (Left)  Requested Anesthesia Type:General  Surgeon: Zve Goldstein MD  Service: Orthopedics  Known or anticipated Date of Surgery:4/18/2022  Previous anesthesia records:None on file      2019  Vent. Rate : 068 BPM     Atrial Rate : 068 BPM      P-R Int : 184 ms          QRS Dur : 096 ms       QT Int : 380 ms       P-R-T Axes : 063 039 053 degrees      QTc Int : 404 ms   Normal sinus rhythm with sinus arrhythmia   Normal ECG   No previous ECGs available   Confirmed by ADONIS HUERTA MD ([id number]) on 10/14/2019 4:00:23 PM     6'5  208#

## 2022-03-31 NOTE — TELEPHONE ENCOUNTER
POST OP PT - [facility]     Start PT on 4/21/22        ----- Message from Zuleyka Marrufo MA sent at 3/30/2022  9:45 AM CDT -----  Patient will do PT at     [facility]  [address] [place] [address], [place] [address]    Phone: [phone]  Fax: [phone]    Pre op - 04/14/22    Date of surgery - 04/18/22

## 2022-04-04 ENCOUNTER — TELEPHONE (OUTPATIENT)
Dept: SPORTS MEDICINE | Facility: CLINIC | Age: 32
End: 2022-04-04
Payer: COMMERCIAL

## 2022-04-04 NOTE — TELEPHONE ENCOUNTER
----- Message from Erica Hazel sent at 4/4/2022  3:33 PM CDT -----  Regarding: Surgery R/s  Regarding:Pt called about r/s surgery to 4/25      Can patient be contacted on Mychart:YES       Requesting Call back number:[phone]

## 2022-04-08 ENCOUNTER — TELEPHONE (OUTPATIENT)
Dept: SPORTS MEDICINE | Facility: CLINIC | Age: 32
End: 2022-04-08
Payer: COMMERCIAL

## 2022-04-08 DIAGNOSIS — Z01.818 PRE-OP TESTING: Primary | ICD-10-CM

## 2022-04-09 ENCOUNTER — PATIENT MESSAGE (OUTPATIENT)
Dept: PREADMISSION TESTING | Facility: HOSPITAL | Age: 32
End: 2022-04-09
Payer: COMMERCIAL

## 2022-04-18 ENCOUNTER — ANESTHESIA EVENT (OUTPATIENT)
Dept: SURGERY | Facility: HOSPITAL | Age: 32
End: 2022-04-18
Payer: COMMERCIAL

## 2022-04-18 NOTE — ANESTHESIA PREPROCEDURE EVALUATION
Chart review complete. Patient's medical history reviewed.  OK to proceed at [facility].    Sourav Garcia MD   4/18/2022 04/18/2022  Volodymyr Pacheco is a 31 y.o., male.      Pre-op Assessment    I have reviewed the Patient Summary Reports.     I have reviewed the Nursing Notes. I have reviewed the NPO Status.   I have reviewed the Medications.     Review of Systems  Anesthesia Hx:  No problems with previous Anesthesia  History of prior surgery of interest to airway management or planning: Previous anesthesia: General Denies Family Hx of Anesthesia complications.   Denies Personal Hx of Anesthesia complications.   Social:  Non-Smoker    Hematology/Oncology:  Hematology Normal   Oncology Normal     EENT/Dental:EENT/Dental Normal   Cardiovascular:  Cardiovascular Normal Exercise tolerance: good     Pulmonary:  Pulmonary Normal    Renal/:  Renal/ Normal     Hepatic/GI:  Hepatic/GI Normal    Musculoskeletal:  Musculoskeletal Normal    Neurological:  Neurology Normal    Endocrine:  Endocrine Normal    Dermatological:  Skin Normal    Psych:  Psychiatric Normal           Physical Exam  General: Well nourished, Cooperative and Alert    Airway:  Mallampati: II / II  Mouth Opening: Normal  TM Distance: Normal  Tongue: Normal  Neck ROM: Normal ROM    Dental:  Intact    Chest/Lungs:  Clear to auscultation, Normal Respiratory Rate    Heart:  Rate: Normal  Rhythm: Regular Rhythm  Sounds: Normal    Abdomen:  Normal, Soft, Nontender        Anesthesia Plan  Type of Anesthesia, risks & benefits discussed:    Anesthesia Type: Gen ETT  Intra-op Monitoring Plan: Standard ASA Monitors  Post Op Pain Control Plan: multimodal analgesia and peripheral nerve block  Induction:  IV  Airway Plan: Direct, Post-Induction  Informed Consent: Informed consent signed with the Patient and all parties understand the risks and agree with  anesthesia plan.  All questions answered.   ASA Score: 1    Ready For Surgery From Anesthesia Perspective.     .

## 2022-04-21 ENCOUNTER — OFFICE VISIT (OUTPATIENT)
Dept: SPORTS MEDICINE | Facility: CLINIC | Age: 32
End: 2022-04-21
Payer: COMMERCIAL

## 2022-04-21 VITALS
WEIGHT: 215 LBS | BODY MASS INDEX: 25.39 KG/M2 | DIASTOLIC BLOOD PRESSURE: 82 MMHG | HEART RATE: 78 BPM | HEIGHT: 77 IN | SYSTOLIC BLOOD PRESSURE: 142 MMHG

## 2022-04-21 DIAGNOSIS — S43.492A BANKART LESION OF LEFT SHOULDER, INITIAL ENCOUNTER: Primary | ICD-10-CM

## 2022-04-21 PROCEDURE — 99999 PR PBB SHADOW E&M-EST. PATIENT-LVL III: CPT | Mod: PBBFAC,,, | Performed by: PHYSICIAN ASSISTANT

## 2022-04-21 PROCEDURE — 1160F PR REVIEW ALL MEDS BY PRESCRIBER/CLIN PHARMACIST DOCUMENTED: ICD-10-PCS | Mod: CPTII,S$GLB,, | Performed by: PHYSICIAN ASSISTANT

## 2022-04-21 PROCEDURE — 99499 UNLISTED E&M SERVICE: CPT | Mod: S$GLB,,, | Performed by: PHYSICIAN ASSISTANT

## 2022-04-21 PROCEDURE — 3008F BODY MASS INDEX DOCD: CPT | Mod: CPTII,S$GLB,, | Performed by: PHYSICIAN ASSISTANT

## 2022-04-21 PROCEDURE — 3077F SYST BP >= 140 MM HG: CPT | Mod: CPTII,S$GLB,, | Performed by: PHYSICIAN ASSISTANT

## 2022-04-21 PROCEDURE — 3077F PR MOST RECENT SYSTOLIC BLOOD PRESSURE >= 140 MM HG: ICD-10-PCS | Mod: CPTII,S$GLB,, | Performed by: PHYSICIAN ASSISTANT

## 2022-04-21 PROCEDURE — 1159F PR MEDICATION LIST DOCUMENTED IN MEDICAL RECORD: ICD-10-PCS | Mod: CPTII,S$GLB,, | Performed by: PHYSICIAN ASSISTANT

## 2022-04-21 PROCEDURE — 1160F RVW MEDS BY RX/DR IN RCRD: CPT | Mod: CPTII,S$GLB,, | Performed by: PHYSICIAN ASSISTANT

## 2022-04-21 PROCEDURE — 3008F PR BODY MASS INDEX (BMI) DOCUMENTED: ICD-10-PCS | Mod: CPTII,S$GLB,, | Performed by: PHYSICIAN ASSISTANT

## 2022-04-21 PROCEDURE — 99499 NO LOS: ICD-10-PCS | Mod: S$GLB,,, | Performed by: PHYSICIAN ASSISTANT

## 2022-04-21 PROCEDURE — 99999 PR PBB SHADOW E&M-EST. PATIENT-LVL III: ICD-10-PCS | Mod: PBBFAC,,, | Performed by: PHYSICIAN ASSISTANT

## 2022-04-21 PROCEDURE — 3079F PR MOST RECENT DIASTOLIC BLOOD PRESSURE 80-89 MM HG: ICD-10-PCS | Mod: CPTII,S$GLB,, | Performed by: PHYSICIAN ASSISTANT

## 2022-04-21 PROCEDURE — 1159F MED LIST DOCD IN RCRD: CPT | Mod: CPTII,S$GLB,, | Performed by: PHYSICIAN ASSISTANT

## 2022-04-21 PROCEDURE — 3079F DIAST BP 80-89 MM HG: CPT | Mod: CPTII,S$GLB,, | Performed by: PHYSICIAN ASSISTANT

## 2022-04-21 RX ORDER — NAPROXEN SODIUM 220 MG/1
81 TABLET, FILM COATED ORAL DAILY
Qty: 21 TABLET | Refills: 0 | Status: SHIPPED | OUTPATIENT
Start: 2022-04-21 | End: 2022-06-01

## 2022-04-21 RX ORDER — OXYCODONE AND ACETAMINOPHEN 10; 325 MG/1; MG/1
1 TABLET ORAL EVERY 6 HOURS PRN
Qty: 28 TABLET | Refills: 0 | Status: SHIPPED | OUTPATIENT
Start: 2022-04-21

## 2022-04-21 RX ORDER — TRAMADOL HYDROCHLORIDE 50 MG/1
50 TABLET ORAL EVERY 6 HOURS PRN
Qty: 28 TABLET | Refills: 0 | Status: SHIPPED | OUTPATIENT
Start: 2022-04-21

## 2022-04-21 RX ORDER — SODIUM CHLORIDE 9 MG/ML
INJECTION, SOLUTION INTRAVENOUS CONTINUOUS
Status: CANCELLED | OUTPATIENT
Start: 2022-04-21

## 2022-04-21 RX ORDER — PROMETHAZINE HYDROCHLORIDE 25 MG/1
25 TABLET ORAL EVERY 6 HOURS PRN
Qty: 20 TABLET | Refills: 0 | Status: SHIPPED | OUTPATIENT
Start: 2022-04-21

## 2022-04-22 NOTE — H&P
"Volodymyr Pacheco  is here for a completion of his perioperative paperwork. he  Is scheduled to undergo:    left   1. Attempted arthroscopic bankart labral repair and bony bankart repair  2. +/- ORIF glenoid fracture  Possible chondroplasty or debridement and loose body removal  3. Possible biceps tenotomy or open subpectoral biceps tenodesis  4. Possible rotator cuff repair versus debridement     on 5/2/2022.      He is a healthy individual and does not need clearance for this procedure.     PAST MEDICAL HISTORY: History reviewed. No pertinent past medical history.  PAST SURGICAL HISTORY: History reviewed. No pertinent surgical history.  FAMILY HISTORY: History reviewed. No pertinent family history.  SOCIAL HISTORY:   Social History     Socioeconomic History    Marital status:    Tobacco Use    Smoking status: Never Smoker    Smokeless tobacco: Never Used   Substance and Sexual Activity    Alcohol use: Yes     Comment: rarelt    Drug use: Never       MEDICATIONS:   Current Outpatient Medications:     bacitracin 500 unit/gram Oint, Apply topically 2 (two) times daily. (Patient not taking: No sig reported), Disp: 30 g, Rfl: 0  ALLERGIES:   Review of patient's allergies indicates:   Allergen Reactions    Sulfa (sulfonamide antibiotics)        VITAL SIGNS: BP (!) 142/82   Pulse 78   Ht 6' 5" (1.956 m)   Wt 97.5 kg (215 lb)   BMI 25.50 kg/m²      Risks, indications and benefits of the surgical procedure were discussed with the patient. All questions with regard to surgery, rehab, expected return to functional activities, activities of daily living and recreational endeavors were answered to his satisfaction.    It was explained to the patient that there may be an increase in surgical risks if the patient has certain co-morbidities such as but not limited to: Obesity, Cardiovascular issues (CHF, CAD, Arrhythmias), chronic pulmonary issues, previous or current neurovascular/neurological issues, previous " strokes, diabetes mellitus, previous wound healing issues, previous wound or skin infections, PVD, clotting disorders, if the patient uses chronic steroids, if the patient takes or has immune compromising medications or diseases, or has previously or currently used tobacco products.     The patient verbalized that he/she does not have any additional clotting, bleeding, or blood disorders, other than what is list in her chart on today's review.     Then a brief history and physical exam were performed.    Review of Systems   Constitution: Negative. Negative for chills, fever and night sweats.   HENT: Negative for congestion and headaches.    Eyes: Negative for blurred vision, left vision loss and right vision loss.   Cardiovascular: Negative for chest pain and syncope.   Respiratory: Negative for cough and shortness of breath.    Endocrine: Negative for polydipsia, polyphagia and polyuria.   Hematologic/Lymphatic: Negative for bleeding problem. Does not bruise/bleed easily.   Skin: Negative for dry skin, itching and rash.   Musculoskeletal: Negative for falls and muscle weakness.   Gastrointestinal: Negative for abdominal pain and bowel incontinence.   Genitourinary: Negative for bladder incontinence and nocturia.   Neurological: Negative for disturbances in coordination, loss of balance and seizures.   Psychiatric/Behavioral: Negative for depression. The patient does not have insomnia.    Allergic/Immunologic: Negative for hives and persistent infections.     PHYSICAL EXAM:  GEN: A&Ox3, WD WN NAD  HEENT: WNL  CHEST: CTAB, no W/R/R  HEART: RRR, no M/R/G  ABD: Soft, NT ND, BS x4 QUADS  MS; See Epic  NEURO: CN II-XII intact       The surgical consent was then reviewed with the patient, who agreed with all the contents of the consent form and it was signed. he was then given the [facility] surgery packet to bring with him to surgery for the anesthesia portion of his perioperative paperwork.   For all physicians  except for Dr. Goldstein, we will email and possibly fax the consent forms and booking sheets to [facility] pre-admit.    The patient was given the opportunity to ask questions about the surgical plan and consent form, and once no other questions were asked, I proceeded with the pre-op appointment.    PHYSICAL THERAPY:  He was also instructed regarding physical therapy and will begin on  POD#5. He was given a copy of the original prescription to schedule. Another copy of this prescription was also faxed to [place] [facility].    POST OP CARE:instructions were reviewed including care of the wound and dressing after surgery and when he can shower.     CRUTCHES OR WALKER: It was explained to the patient that if they are having a lower extremity surgery that they will require either a walker or crutches to ambulate safely with after surgery. It was explained that a cane or other assistive devices are not sufficient to safely ambulate with after surgery. I explained to the patient that I will place an order for them to receive either crutches or a walker after surgery to go home with. It was explained that if they have crutches or a walker at home already, that they are REQUIRED to bring them to the hospital on the day of surgery. It was explained that if they do not have them at the hospital on the day of surgery that they WILL be provided a new pair or crutches or a walker to go home with to ensure ambulation will be safe if the patient needs to stop somewhere on the way home.      PAIN MANAGEMENT: Volodymyr Pacheco was also given their pain management regimen, which includes the TENS unit given to him by [facility] along with the education required for its use. He was also instructed regarding the Polar ice unit that will be in place after surgery and his postoperative pain medications.     PAIN MEDICATION:  Percocet 10/325mg 1 po q 4-6 hours prn pain  Ultram 50 mg Take 1-2 p.o. q.6 hours p.r.n.  breakthrough pain,   Phenergan 25 mg one p.o. q.6 hours p.r.n. nausea and vomiting.  Aspirin 81 mg one p.o. QD x 3 weeks for DVT prophylaxis    Post op meds to be delivered bedside prior to discharge. Deliver to family if patient is in surgery at 5pm.    The patient was told that narcotic pain medications may make them drowsy and instructions were given to not sign legal documents, drive or operate heavy machinery, cars, or equipment while under the influence of narcotic medications. The patient was told and understands that narcotic pain medications should only be used as needed to control pain and that other options of pain control include TENs unit and ice packs/unit.     Patient was instructed to purchase and take Colace to counter possible GI side effects of taking opiates.     DVT prophylaxis was discussed with the patient today including risk factors for developing DVTs and history of DVTs. The patient was asked if any specific recommendations were given from the doctor/s that did pre-operative surgical clearance. The patient was then given an education sheet about DVTs and PE with warning signs and symptoms of both and steps to take if they suspect either of these.    Patient was asked if they were taking or using OCP pills or devices. If they answered yes, then they were instructed to stop using OCPs at this pre-operative appointment until 2 months post-op to help prevent DVT development. They understand that there are other forms of birth control that do not involve hormones. They expressed understanding that ignoring/not following this instruction could result in a DVT which could turn into a deadly pulmonary embolism.     This along with the Modified Caprini risk assessment model for VTE in general surgical patients was used to determine the patient's DVT risk.     From: Anita RAMON, Stepan DA, Lucia SM, et al. Prevention of VTE in nonorthopedic surgical patients: antithrombotic therapy and prevention of  thrombosis, 9th ed: American College of Chest Physicians evidence-based clinical practical guidelines. Chest 2012; 141:e227S. Copyright © 2012. Reproduced with permission from the American College of Chest Physicians.    The below listed DVT prophylaxis regimen was discussed along with SCDs during surgery and bilateral KALEY compression stockings to be used post-op. Length of treatment has been determined to be 10-42 days post-op by the above noted Caprini assessment model. Early ambulation post-op was also discussed and emphasized with the patient.     The patient was instructed to buy and take:  Aspirin 81mg QD x 3 weeks for DVT prophylaxis starting on the morning after surgery.  Patient will also use bilateral TEDs on lower extremities, SCDs during surgery, and early ambulation post-op. If the patient was previously taking 81mg baby aspirin, they were told to not take it starting 5 days prior to surgery and to restart the 81mg aspirin after surgery.       Patient was also told to buy over the counter Prilosec medication if needed and take it once daily for GI protection as long as they are taking NSAIDs or Aspirin.      Patient denies history of seizures.     I explained to following and the patient expressed understanding:  The patient is currently aware of the COVID19 pandemic and that proceeding with their surgical procedure could potentially increase exposure to coronavirus in the community. The patient understands that there is the possibility of delayed or cancelled appts or PT visits in the future. They understand that infection with the coronavirus could complicate their surgery recovery. They are aware of the current policies and procedures of [facility] and the government regarding the pandemic and they were given the option of delaying my surgery. The patient elects to proceed with surgery at this time.       The patient has been scheduled to undergo coronavirus testing on the day prior to surgery.     The  patient was instructed to practice strict social distancing, hand washing/hygiene, respiratory hygiene, and cough etiquette from now until 6 weeks following surgery to reduce the risk of yuliana coronavirus.    As there were no other questions to be asked, he was given my business card along with Zev Goldstein MD business card if he has any questions or concerns prior to surgery or in the postop period.

## 2022-04-22 NOTE — H&P (VIEW-ONLY)
"Volodymyr Pacheco  is here for a completion of his perioperative paperwork. he  Is scheduled to undergo:    left   1. Attempted arthroscopic bankart labral repair and bony bankart repair  2. +/- ORIF glenoid fracture  Possible chondroplasty or debridement and loose body removal  3. Possible biceps tenotomy or open subpectoral biceps tenodesis  4. Possible rotator cuff repair versus debridement     on 5/2/2022.      He is a healthy individual and does not need clearance for this procedure.     PAST MEDICAL HISTORY: History reviewed. No pertinent past medical history.  PAST SURGICAL HISTORY: History reviewed. No pertinent surgical history.  FAMILY HISTORY: History reviewed. No pertinent family history.  SOCIAL HISTORY:   Social History     Socioeconomic History    Marital status:    Tobacco Use    Smoking status: Never Smoker    Smokeless tobacco: Never Used   Substance and Sexual Activity    Alcohol use: Yes     Comment: rarelt    Drug use: Never       MEDICATIONS:   Current Outpatient Medications:     bacitracin 500 unit/gram Oint, Apply topically 2 (two) times daily. (Patient not taking: No sig reported), Disp: 30 g, Rfl: 0  ALLERGIES:   Review of patient's allergies indicates:   Allergen Reactions    Sulfa (sulfonamide antibiotics)        VITAL SIGNS: BP (!) 142/82   Pulse 78   Ht 6' 5" (1.956 m)   Wt 97.5 kg (215 lb)   BMI 25.50 kg/m²      Risks, indications and benefits of the surgical procedure were discussed with the patient. All questions with regard to surgery, rehab, expected return to functional activities, activities of daily living and recreational endeavors were answered to his satisfaction.    It was explained to the patient that there may be an increase in surgical risks if the patient has certain co-morbidities such as but not limited to: Obesity, Cardiovascular issues (CHF, CAD, Arrhythmias), chronic pulmonary issues, previous or current neurovascular/neurological issues, previous " strokes, diabetes mellitus, previous wound healing issues, previous wound or skin infections, PVD, clotting disorders, if the patient uses chronic steroids, if the patient takes or has immune compromising medications or diseases, or has previously or currently used tobacco products.     The patient verbalized that he/she does not have any additional clotting, bleeding, or blood disorders, other than what is list in her chart on today's review.     Then a brief history and physical exam were performed.    Review of Systems   Constitution: Negative. Negative for chills, fever and night sweats.   HENT: Negative for congestion and headaches.    Eyes: Negative for blurred vision, left vision loss and right vision loss.   Cardiovascular: Negative for chest pain and syncope.   Respiratory: Negative for cough and shortness of breath.    Endocrine: Negative for polydipsia, polyphagia and polyuria.   Hematologic/Lymphatic: Negative for bleeding problem. Does not bruise/bleed easily.   Skin: Negative for dry skin, itching and rash.   Musculoskeletal: Negative for falls and muscle weakness.   Gastrointestinal: Negative for abdominal pain and bowel incontinence.   Genitourinary: Negative for bladder incontinence and nocturia.   Neurological: Negative for disturbances in coordination, loss of balance and seizures.   Psychiatric/Behavioral: Negative for depression. The patient does not have insomnia.    Allergic/Immunologic: Negative for hives and persistent infections.     PHYSICAL EXAM:  GEN: A&Ox3, WD WN NAD  HEENT: WNL  CHEST: CTAB, no W/R/R  HEART: RRR, no M/R/G  ABD: Soft, NT ND, BS x4 QUADS  MS; See Epic  NEURO: CN II-XII intact       The surgical consent was then reviewed with the patient, who agreed with all the contents of the consent form and it was signed. he was then given the [facility] surgery packet to bring with him to surgery for the anesthesia portion of his perioperative paperwork.   For all physicians  except for Dr. Goldstein, we will email and possibly fax the consent forms and booking sheets to [facility] pre-admit.    The patient was given the opportunity to ask questions about the surgical plan and consent form, and once no other questions were asked, I proceeded with the pre-op appointment.    PHYSICAL THERAPY:  He was also instructed regarding physical therapy and will begin on  POD#5. He was given a copy of the original prescription to schedule. Another copy of this prescription was also faxed to [facility].    POST OP CARE:instructions were reviewed including care of the wound and dressing after surgery and when he can shower.     CRUTCHES OR WALKER: It was explained to the patient that if they are having a lower extremity surgery that they will require either a walker or crutches to ambulate safely with after surgery. It was explained that a cane or other assistive devices are not sufficient to safely ambulate with after surgery. I explained to the patient that I will place an order for them to receive either crutches or a walker after surgery to go home with. It was explained that if they have crutches or a walker at home already, that they are REQUIRED to bring them to the hospital on the day of surgery. It was explained that if they do not have them at the hospital on the day of surgery that they WILL be provided a new pair or crutches or a walker to go home with to ensure ambulation will be safe if the patient needs to stop somewhere on the way home.      PAIN MANAGEMENT: Volodymyr Pacheco was also given their pain management regimen, which includes the TENS unit given to him by [facility] along with the education required for its use. He was also instructed regarding the Polar ice unit that will be in place after surgery and his postoperative pain medications.     PAIN MEDICATION:  Percocet 10/325mg 1 po q 4-6 hours prn pain  Ultram 50 mg Take 1-2 p.o. q.6 hours p.r.n.  breakthrough pain,   Phenergan 25 mg one p.o. q.6 hours p.r.n. nausea and vomiting.  Aspirin 81 mg one p.o. QD x 3 weeks for DVT prophylaxis    Post op meds to be delivered bedside prior to discharge. Deliver to family if patient is in surgery at 5pm.    The patient was told that narcotic pain medications may make them drowsy and instructions were given to not sign legal documents, drive or operate heavy machinery, cars, or equipment while under the influence of narcotic medications. The patient was told and understands that narcotic pain medications should only be used as needed to control pain and that other options of pain control include TENs unit and ice packs/unit.     Patient was instructed to purchase and take Colace to counter possible GI side effects of taking opiates.     DVT prophylaxis was discussed with the patient today including risk factors for developing DVTs and history of DVTs. The patient was asked if any specific recommendations were given from the doctor/s that did pre-operative surgical clearance. The patient was then given an education sheet about DVTs and PE with warning signs and symptoms of both and steps to take if they suspect either of these.    Patient was asked if they were taking or using OCP pills or devices. If they answered yes, then they were instructed to stop using OCPs at this pre-operative appointment until 2 months post-op to help prevent DVT development. They understand that there are other forms of birth control that do not involve hormones. They expressed understanding that ignoring/not following this instruction could result in a DVT which could turn into a deadly pulmonary embolism.     This along with the Modified Caprini risk assessment model for VTE in general surgical patients was used to determine the patient's DVT risk.     From: Anita RAMON, Stepan DA, Lucia SM, et al. Prevention of VTE in nonorthopedic surgical patients: antithrombotic therapy and prevention of  thrombosis, 9th ed: American College of Chest Physicians evidence-based clinical practical guidelines. Chest 2012; 141:e227S. Copyright © 2012. Reproduced with permission from the American College of Chest Physicians.    The below listed DVT prophylaxis regimen was discussed along with SCDs during surgery and bilateral KALEY compression stockings to be used post-op. Length of treatment has been determined to be 10-42 days post-op by the above noted Caprini assessment model. Early ambulation post-op was also discussed and emphasized with the patient.     The patient was instructed to buy and take:  Aspirin 81mg QD x 3 weeks for DVT prophylaxis starting on the morning after surgery.  Patient will also use bilateral TEDs on lower extremities, SCDs during surgery, and early ambulation post-op. If the patient was previously taking 81mg baby aspirin, they were told to not take it starting 5 days prior to surgery and to restart the 81mg aspirin after surgery.       Patient was also told to buy over the counter Prilosec medication if needed and take it once daily for GI protection as long as they are taking NSAIDs or Aspirin.      Patient denies history of seizures.     I explained to following and the patient expressed understanding:  The patient is currently aware of the COVID19 pandemic and that proceeding with their surgical procedure could potentially increase exposure to coronavirus in the community. The patient understands that there is the possibility of delayed or cancelled appts or PT visits in the future. They understand that infection with the coronavirus could complicate their surgery recovery. They are aware of the current policies and procedures of [facility] and the government regarding the pandemic and they were given the option of delaying my surgery. The patient elects to proceed with surgery at this time.       The patient has been scheduled to undergo coronavirus testing on the day prior to surgery.     The  patient was instructed to practice strict social distancing, hand washing/hygiene, respiratory hygiene, and cough etiquette from now until 6 weeks following surgery to reduce the risk of yuliana coronavirus.    As there were no other questions to be asked, he was given my business card along with Zev Goldstein MD business card if he has any questions or concerns prior to surgery or in the postop period.

## 2022-04-25 ENCOUNTER — ANESTHESIA (OUTPATIENT)
Dept: SURGERY | Facility: HOSPITAL | Age: 32
End: 2022-04-25
Payer: COMMERCIAL

## 2022-04-25 ENCOUNTER — PATIENT MESSAGE (OUTPATIENT)
Dept: SPORTS MEDICINE | Facility: CLINIC | Age: 32
End: 2022-04-25
Payer: COMMERCIAL

## 2022-05-08 ENCOUNTER — LAB VISIT (OUTPATIENT)
Dept: SURGERY | Facility: CLINIC | Age: 32
End: 2022-05-08
Payer: COMMERCIAL

## 2022-05-08 DIAGNOSIS — Z01.818 PRE-OP TESTING: ICD-10-CM

## 2022-05-08 LAB — SARS-COV-2 RNA RESP QL NAA+PROBE: NOT DETECTED

## 2022-05-08 PROCEDURE — U0003 INFECTIOUS AGENT DETECTION BY NUCLEIC ACID (DNA OR RNA); SEVERE ACUTE RESPIRATORY SYNDROME CORONAVIRUS 2 (SARS-COV-2) (CORONAVIRUS DISEASE [COVID-19]), AMPLIFIED PROBE TECHNIQUE, MAKING USE OF HIGH THROUGHPUT TECHNOLOGIES AS DESCRIBED BY CMS-2020-01-R: HCPCS | Performed by: ORTHOPAEDIC SURGERY

## 2022-05-08 PROCEDURE — U0005 INFEC AGEN DETEC AMPLI PROBE: HCPCS | Performed by: ORTHOPAEDIC SURGERY

## 2022-05-09 ENCOUNTER — PATIENT MESSAGE (OUTPATIENT)
Dept: SPORTS MEDICINE | Facility: CLINIC | Age: 32
End: 2022-05-09
Payer: COMMERCIAL

## 2022-05-09 NOTE — TELEPHONE ENCOUNTER
Called and spoke with patient regarding questions about his upcoming surgery. All questions were answered and patients verbalized understanding.

## 2022-05-10 ENCOUNTER — TELEPHONE (OUTPATIENT)
Dept: SPORTS MEDICINE | Facility: CLINIC | Age: 32
End: 2022-05-10
Payer: COMMERCIAL

## 2022-05-10 NOTE — TELEPHONE ENCOUNTER
----- Message from Izabel Story sent at 5/10/2022  9:40 AM CDT -----  Regarding: Call Back  Who Called: pt          What is the reason for the call: pt is calling in regards to surgery time. States he has to fast and needs to know what time. Please contact         Can patient be contacted on [facility]: Yes           Call back number: [phone]

## 2022-05-11 ENCOUNTER — HOSPITAL ENCOUNTER (OUTPATIENT)
Facility: HOSPITAL | Age: 32
Discharge: HOME | End: 2022-05-11
Attending: ORTHOPAEDIC SURGERY | Admitting: ORTHOPAEDIC SURGERY
Payer: COMMERCIAL

## 2022-05-11 ENCOUNTER — NURSE TRIAGE (OUTPATIENT)
Dept: ADMINISTRATIVE | Facility: CLINIC | Age: 32
End: 2022-05-11
Payer: COMMERCIAL

## 2022-05-11 VITALS
DIASTOLIC BLOOD PRESSURE: 57 MMHG | BODY MASS INDEX: 24.32 KG/M2 | HEIGHT: 77 IN | OXYGEN SATURATION: 100 % | TEMPERATURE: 98 F | HEART RATE: 62 BPM | SYSTOLIC BLOOD PRESSURE: 103 MMHG | WEIGHT: 206 LBS | RESPIRATION RATE: 11 BRPM

## 2022-05-11 DIAGNOSIS — S43.492A BANKART LESION OF LEFT SHOULDER, INITIAL ENCOUNTER: Primary | ICD-10-CM

## 2022-05-11 PROCEDURE — 63600175 PHARM REV CODE 636 W HCPCS: Performed by: SURGERY

## 2022-05-11 PROCEDURE — 76942 ECHO GUIDE FOR BIOPSY: CPT | Mod: 26,,, | Performed by: ANESTHESIOLOGY

## 2022-05-11 PROCEDURE — D9220A PRA ANESTHESIA: Mod: ANES,,, | Performed by: ANESTHESIOLOGY

## 2022-05-11 PROCEDURE — C1713 ANCHOR/SCREW BN/BN,TIS/BN: HCPCS | Performed by: ORTHOPAEDIC SURGERY

## 2022-05-11 PROCEDURE — 76942 ECHO GUIDE FOR BIOPSY: CPT | Performed by: SURGERY

## 2022-05-11 PROCEDURE — 64416 NJX AA&/STRD BRCH PL NFS IMG: CPT | Mod: 59,LT,, | Performed by: ANESTHESIOLOGY

## 2022-05-11 PROCEDURE — 76942 PR U/S GUIDANCE FOR NEEDLE GUIDANCE: ICD-10-PCS | Mod: 26,,, | Performed by: ANESTHESIOLOGY

## 2022-05-11 PROCEDURE — 36000710: Performed by: ORTHOPAEDIC SURGERY

## 2022-05-11 PROCEDURE — 29819 PR SHLDR ARTHROSCOP,SURG,W/REMOVAL,LOOSE/FB: ICD-10-PCS | Mod: 59,51,LT, | Performed by: ORTHOPAEDIC SURGERY

## 2022-05-11 PROCEDURE — D9220A PRA ANESTHESIA: Mod: CRNA,,, | Performed by: NURSE ANESTHETIST, CERTIFIED REGISTERED

## 2022-05-11 PROCEDURE — 71000033 HC RECOVERY, INTIAL HOUR: Performed by: ORTHOPAEDIC SURGERY

## 2022-05-11 PROCEDURE — 71000039 HC RECOVERY, EACH ADD'L HOUR: Performed by: ORTHOPAEDIC SURGERY

## 2022-05-11 PROCEDURE — 29806 PR SHLDR ARTHROSCOP,SURG,CAPSULORRHAPHY: ICD-10-PCS | Mod: LT,,, | Performed by: ORTHOPAEDIC SURGERY

## 2022-05-11 PROCEDURE — 63600175 PHARM REV CODE 636 W HCPCS: Performed by: ANESTHESIOLOGY

## 2022-05-11 PROCEDURE — 64450 NJX AA&/STRD OTHER PN/BRANCH: CPT | Mod: 59,LT | Performed by: SURGERY

## 2022-05-11 PROCEDURE — D9220A PRA ANESTHESIA: ICD-10-PCS | Mod: CRNA,,, | Performed by: NURSE ANESTHETIST, CERTIFIED REGISTERED

## 2022-05-11 PROCEDURE — 27201423 OPTIME MED/SURG SUP & DEVICES STERILE SUPPLY: Performed by: ORTHOPAEDIC SURGERY

## 2022-05-11 PROCEDURE — 36000711: Performed by: ORTHOPAEDIC SURGERY

## 2022-05-11 PROCEDURE — 63600175 PHARM REV CODE 636 W HCPCS: Performed by: PHYSICIAN ASSISTANT

## 2022-05-11 PROCEDURE — 25000003 PHARM REV CODE 250: Performed by: PHYSICIAN ASSISTANT

## 2022-05-11 PROCEDURE — 94761 N-INVAS EAR/PLS OXIMETRY MLT: CPT

## 2022-05-11 PROCEDURE — 37000008 HC ANESTHESIA 1ST 15 MINUTES: Performed by: ORTHOPAEDIC SURGERY

## 2022-05-11 PROCEDURE — 29819 SHO ARTHRS SRG RMVL LOOSE/FB: CPT | Mod: 59,51,LT, | Performed by: ORTHOPAEDIC SURGERY

## 2022-05-11 PROCEDURE — 63600175 PHARM REV CODE 636 W HCPCS: Performed by: NURSE ANESTHETIST, CERTIFIED REGISTERED

## 2022-05-11 PROCEDURE — 25000003 PHARM REV CODE 250: Performed by: NURSE ANESTHETIST, CERTIFIED REGISTERED

## 2022-05-11 PROCEDURE — D9220A PRA ANESTHESIA: ICD-10-PCS | Mod: ANES,,, | Performed by: ANESTHESIOLOGY

## 2022-05-11 PROCEDURE — 37000009 HC ANESTHESIA EA ADD 15 MINS: Performed by: ORTHOPAEDIC SURGERY

## 2022-05-11 PROCEDURE — 29806 SHO ARTHRS SRG CAPSULORRAPHY: CPT | Mod: LT,,, | Performed by: ORTHOPAEDIC SURGERY

## 2022-05-11 PROCEDURE — 71000015 HC POSTOP RECOV 1ST HR: Performed by: ORTHOPAEDIC SURGERY

## 2022-05-11 PROCEDURE — 64416 PR NERVE BLOCK INJ, ANES/STEROID, BRACHIAL PLEXUS, CONT INFUSION, INCL IMAG GUIDANCE: ICD-10-PCS | Mod: 59,LT,, | Performed by: ANESTHESIOLOGY

## 2022-05-11 PROCEDURE — 64450 NJX AA&/STRD OTHER PN/BRANCH: CPT | Mod: 59,LT,, | Performed by: ANESTHESIOLOGY

## 2022-05-11 PROCEDURE — 99900035 HC TECH TIME PER 15 MIN (STAT)

## 2022-05-11 PROCEDURE — 25000003 PHARM REV CODE 250: Performed by: SURGERY

## 2022-05-11 PROCEDURE — 64450 PR NERVE BLOCK INJ, ANES/STEROID, OTHER PERIPHERAL: ICD-10-PCS | Mod: 59,LT,, | Performed by: ANESTHESIOLOGY

## 2022-05-11 PROCEDURE — 63600175 PHARM REV CODE 636 W HCPCS: Performed by: ORTHOPAEDIC SURGERY

## 2022-05-11 DEVICE — ANCHOR SUTURETAK 3MM BC: Type: IMPLANTABLE DEVICE | Site: SHOULDER | Status: FUNCTIONAL

## 2022-05-11 DEVICE — ANCHOR BIOCOMPOSITE 2.9X15.5MM: Type: IMPLANTABLE DEVICE | Site: SHOULDER | Status: FUNCTIONAL

## 2022-05-11 RX ORDER — DEXMEDETOMIDINE HYDROCHLORIDE 100 UG/ML
INJECTION, SOLUTION INTRAVENOUS
Status: DISCONTINUED | OUTPATIENT
Start: 2022-05-11 | End: 2022-05-11

## 2022-05-11 RX ORDER — ROPIVACAINE HYDROCHLORIDE 5 MG/ML
INJECTION, SOLUTION EPIDURAL; INFILTRATION; PERINEURAL
Status: COMPLETED | OUTPATIENT
Start: 2022-05-11 | End: 2022-05-11

## 2022-05-11 RX ORDER — CEFAZOLIN SODIUM/WATER 2 G/20 ML
2 SYRINGE (ML) INTRAVENOUS
Status: COMPLETED | OUTPATIENT
Start: 2022-05-11 | End: 2022-05-11

## 2022-05-11 RX ORDER — PROPOFOL 10 MG/ML
VIAL (ML) INTRAVENOUS
Status: DISCONTINUED | OUTPATIENT
Start: 2022-05-11 | End: 2022-05-11

## 2022-05-11 RX ORDER — EPINEPHRINE 1 MG/ML
INJECTION, SOLUTION INTRACARDIAC; INTRAMUSCULAR; INTRAVENOUS; SUBCUTANEOUS
Status: DISCONTINUED | OUTPATIENT
Start: 2022-05-11 | End: 2022-05-11 | Stop reason: HOSPADM

## 2022-05-11 RX ORDER — ROPIVACAINE HYDROCHLORIDE 2 MG/ML
INJECTION, SOLUTION EPIDURAL; INFILTRATION; PERINEURAL CONTINUOUS
Status: DISCONTINUED | OUTPATIENT
Start: 2022-05-11 | End: 2022-05-11 | Stop reason: HOSPADM

## 2022-05-11 RX ORDER — MIDAZOLAM HYDROCHLORIDE 1 MG/ML
.5-4 INJECTION INTRAMUSCULAR; INTRAVENOUS
Status: DISCONTINUED | OUTPATIENT
Start: 2022-05-11 | End: 2022-05-11 | Stop reason: HOSPADM

## 2022-05-11 RX ORDER — HALOPERIDOL 5 MG/ML
0.5 INJECTION INTRAMUSCULAR ONCE
Status: COMPLETED | OUTPATIENT
Start: 2022-05-11 | End: 2022-05-11

## 2022-05-11 RX ORDER — CARBOXYMETHYLCELLULOSE SODIUM 10 MG/ML
GEL OPHTHALMIC
Status: DISCONTINUED | OUTPATIENT
Start: 2022-05-11 | End: 2022-05-11

## 2022-05-11 RX ORDER — FAMOTIDINE 10 MG/ML
INJECTION INTRAVENOUS
Status: DISCONTINUED | OUTPATIENT
Start: 2022-05-11 | End: 2022-05-11

## 2022-05-11 RX ORDER — METHOCARBAMOL 500 MG/1
1000 TABLET, FILM COATED ORAL ONCE
Status: DISCONTINUED | OUTPATIENT
Start: 2022-05-11 | End: 2022-05-11 | Stop reason: HOSPADM

## 2022-05-11 RX ORDER — NEOSTIGMINE METHYLSULFATE 1 MG/ML
INJECTION, SOLUTION INTRAVENOUS
Status: DISCONTINUED | OUTPATIENT
Start: 2022-05-11 | End: 2022-05-11

## 2022-05-11 RX ORDER — FENTANYL CITRATE 50 UG/ML
25-200 INJECTION, SOLUTION INTRAMUSCULAR; INTRAVENOUS
Status: DISCONTINUED | OUTPATIENT
Start: 2022-05-11 | End: 2022-05-11 | Stop reason: HOSPADM

## 2022-05-11 RX ORDER — ONDANSETRON 2 MG/ML
INJECTION INTRAMUSCULAR; INTRAVENOUS
Status: DISCONTINUED | OUTPATIENT
Start: 2022-05-11 | End: 2022-05-11

## 2022-05-11 RX ORDER — DIPHENHYDRAMINE HYDROCHLORIDE 50 MG/ML
25 INJECTION INTRAMUSCULAR; INTRAVENOUS EVERY 6 HOURS PRN
Status: DISCONTINUED | OUTPATIENT
Start: 2022-05-11 | End: 2022-05-11 | Stop reason: HOSPADM

## 2022-05-11 RX ORDER — LORAZEPAM 2 MG/ML
0.25 INJECTION INTRAMUSCULAR ONCE AS NEEDED
Status: DISCONTINUED | OUTPATIENT
Start: 2022-05-11 | End: 2022-05-11 | Stop reason: HOSPADM

## 2022-05-11 RX ORDER — ROCURONIUM BROMIDE 10 MG/ML
INJECTION, SOLUTION INTRAVENOUS
Status: DISCONTINUED | OUTPATIENT
Start: 2022-05-11 | End: 2022-05-11

## 2022-05-11 RX ORDER — FENTANYL CITRATE 50 UG/ML
25 INJECTION, SOLUTION INTRAMUSCULAR; INTRAVENOUS EVERY 5 MIN PRN
Status: DISCONTINUED | OUTPATIENT
Start: 2022-05-11 | End: 2022-05-11 | Stop reason: HOSPADM

## 2022-05-11 RX ORDER — LIDOCAINE HYDROCHLORIDE 10 MG/ML
1 INJECTION, SOLUTION EPIDURAL; INFILTRATION; INTRACAUDAL; PERINEURAL ONCE
Status: DISCONTINUED | OUTPATIENT
Start: 2022-05-11 | End: 2022-05-11 | Stop reason: HOSPADM

## 2022-05-11 RX ORDER — SODIUM CHLORIDE 9 MG/ML
INJECTION, SOLUTION INTRAVENOUS CONTINUOUS
Status: DISCONTINUED | OUTPATIENT
Start: 2022-05-11 | End: 2022-05-11 | Stop reason: HOSPADM

## 2022-05-11 RX ORDER — DEXAMETHASONE SODIUM PHOSPHATE 4 MG/ML
INJECTION, SOLUTION INTRA-ARTICULAR; INTRALESIONAL; INTRAMUSCULAR; INTRAVENOUS; SOFT TISSUE
Status: DISCONTINUED | OUTPATIENT
Start: 2022-05-11 | End: 2022-05-11

## 2022-05-11 RX ORDER — ACETAMINOPHEN 500 MG
1000 TABLET ORAL
Status: COMPLETED | OUTPATIENT
Start: 2022-05-11 | End: 2022-05-11

## 2022-05-11 RX ORDER — CELECOXIB 200 MG/1
400 CAPSULE ORAL ONCE
Status: DISCONTINUED | OUTPATIENT
Start: 2022-05-11 | End: 2022-05-11 | Stop reason: HOSPADM

## 2022-05-11 RX ORDER — LIDOCAINE HYDROCHLORIDE 20 MG/ML
INJECTION INTRAVENOUS
Status: DISCONTINUED | OUTPATIENT
Start: 2022-05-11 | End: 2022-05-11

## 2022-05-11 RX ADMIN — ROCURONIUM BROMIDE 50 MG: 10 INJECTION, SOLUTION INTRAVENOUS at 07:05

## 2022-05-11 RX ADMIN — FENTANYL CITRATE 100 MCG: 50 INJECTION, SOLUTION INTRAMUSCULAR; INTRAVENOUS at 06:05

## 2022-05-11 RX ADMIN — ONDANSETRON 4 MG: 2 INJECTION, SOLUTION INTRAMUSCULAR; INTRAVENOUS at 10:05

## 2022-05-11 RX ADMIN — PROPOFOL 300 MG: 10 INJECTION, EMULSION INTRAVENOUS at 07:05

## 2022-05-11 RX ADMIN — NEOSTIGMINE METHYLSULFATE 5 MG: 1 INJECTION INTRAVENOUS at 10:05

## 2022-05-11 RX ADMIN — MIDAZOLAM HYDROCHLORIDE 2 MG: 1 INJECTION, SOLUTION INTRAMUSCULAR; INTRAVENOUS at 06:05

## 2022-05-11 RX ADMIN — GLYCOPYRROLATE 0.6 MG: 0.2 INJECTION, SOLUTION INTRAMUSCULAR; INTRAVITREAL at 10:05

## 2022-05-11 RX ADMIN — DEXAMETHASONE SODIUM PHOSPHATE 8 MG: 4 INJECTION, SOLUTION INTRAMUSCULAR; INTRAVENOUS at 07:05

## 2022-05-11 RX ADMIN — Medication 2 G: at 07:05

## 2022-05-11 RX ADMIN — HALOPERIDOL LACTATE 0.5 MG: 5 INJECTION, SOLUTION INTRAMUSCULAR at 11:05

## 2022-05-11 RX ADMIN — SODIUM CHLORIDE: 0.9 INJECTION, SOLUTION INTRAVENOUS at 06:05

## 2022-05-11 RX ADMIN — LIDOCAINE HYDROCHLORIDE 100 MG: 20 INJECTION, SOLUTION INTRAVENOUS at 07:05

## 2022-05-11 RX ADMIN — SODIUM CHLORIDE, SODIUM GLUCONATE, SODIUM ACETATE, POTASSIUM CHLORIDE, MAGNESIUM CHLORIDE, SODIUM PHOSPHATE, DIBASIC, AND POTASSIUM PHOSPHATE: .53; .5; .37; .037; .03; .012; .00082 INJECTION, SOLUTION INTRAVENOUS at 07:05

## 2022-05-11 RX ADMIN — FAMOTIDINE 20 MG: 10 INJECTION, SOLUTION INTRAVENOUS at 07:05

## 2022-05-11 RX ADMIN — CARBOXYMETHYLCELLULOSE SODIUM 4 DROP: 10 GEL OPHTHALMIC at 07:05

## 2022-05-11 RX ADMIN — DEXMEDETOMIDINE HYDROCHLORIDE 12 MCG: 100 INJECTION, SOLUTION, CONCENTRATE INTRAVENOUS at 07:05

## 2022-05-11 RX ADMIN — DEXMEDETOMIDINE HYDROCHLORIDE 8 MCG: 100 INJECTION, SOLUTION, CONCENTRATE INTRAVENOUS at 07:05

## 2022-05-11 RX ADMIN — ACETAMINOPHEN 1000 MG: 500 TABLET ORAL at 06:05

## 2022-05-11 RX ADMIN — ROPIVACAINE HYDROCHLORIDE 10 ML: 5 INJECTION, SOLUTION EPIDURAL; INFILTRATION; PERINEURAL at 07:05

## 2022-05-11 RX ADMIN — ROPIVACAINE HYDROCHLORIDE 7.5 ML: 5 INJECTION, SOLUTION EPIDURAL; INFILTRATION; PERINEURAL at 06:05

## 2022-05-11 RX ADMIN — Medication: at 10:05

## 2022-05-11 NOTE — ANESTHESIA PROCEDURE NOTES
Interscalene Brachial Plexus Catheter    Patient location during procedure: pre-op   Block not for primary anesthetic.  Reason for block: at surgeon's request and post-op pain management   Post-op Pain Location: left shoulder   Start time: 5/11/2022 6:41 AM  Timeout: 5/11/2022 6:40 AM   End time: 5/11/2022 6:55 AM    Staffing  Authorizing Provider: Harvinder Early MD  Performing Provider: Harris Cordova MD    Preanesthetic Checklist  Completed: patient identified, IV checked, site marked, risks and benefits discussed, surgical consent, monitors and equipment checked, pre-op evaluation and timeout performed  Peripheral Block  Patient position: sitting  Prep: ChloraPrep and site prepped and draped  Patient monitoring: heart rate, cardiac monitor, continuous pulse ox, continuous capnometry and frequent blood pressure checks  Block type: interscalene  Laterality: left  Injection technique: continuous  Needle  Needle type: Tuohy   Needle gauge: 18 G  Needle length: 2 in  Needle localization: anatomical landmarks and ultrasound guidance  Catheter type: non-stimulating  Catheter size: 20 G  Test dose: lidocaine 1.5% with Epi 1-to-200,000 and negative   -ultrasound image captured on disc.  Assessment  Injection assessment: negative aspiration, negative parasthesia and local visualized surrounding nerve  Paresthesia pain: none  Heart rate change: no  Slow fractionated injection: yes  Pain Tolerance: comfortable throughout block and no complaints  Medications:    Medications: ropivacaine (NAROPIN) injection 0.5% - Perineural   7.5 mL - 5/11/2022 6:45:00 AM    Additional Notes  VSS.  [facility] RN monitoring vitals throughout procedure.  Patient tolerated procedure well.  15 cc of 0.25% ropivacaine with epi 1:300k administered for the block.

## 2022-05-11 NOTE — DISCHARGE INSTRUCTIONS
[employer] Mobile CARE CUBE COLD THERAPY SYSTEM    The Polar Care Cube Cold Therapy System is simple and reliable. It is easy to use, compact design makes it great for home use. With the addition of ice and water, you will enjoy 6-8 hours of effortless cold therapy. Proper use requires an insulation barrier between the pad and the patient's skin.  Instructions on how to use the Polar Care Cube Cold Therapy System Below:

## 2022-05-11 NOTE — PLAN OF CARE
Pre op complete. Patient resting comfortably. Call light in reach. Wife to bedside shortly, will take belongings.

## 2022-05-11 NOTE — BRIEF OP NOTE
[facility] - Surgery ([facility])  Brief Operative Note    Surgery Date: 5/11/2022     Surgeon(s) and Role:     * Zev Goldstein MD - Primary    Assisting Surgeon:   Juan Carlos Osullivan DO - Fellow    Pre-op Diagnosis:  Bankart lesion of left shoulder, initial encounter [S43.492A]  Biceps tendinitis of left upper extremity [M75.22]  Arthritis of left acromioclavicular joint [M19.012]    Post-op Diagnosis:  Post-Op Diagnosis Codes:     * Bankart lesion of left shoulder, initial encounter [S43.492A]     * Biceps tendinitis of left upper extremity [M75.22]     * Arthritis of left acromioclavicular joint [M19.012]    Procedure(s) (LRB):  ARTHROSCOPY, SHOULDER, WITH BANKART REPAIR (Left)  ARTHROSCOPY,SHOULDER,WITH CAPSULORRHAPHY (Left)  REMOVAL, LOOSE BODY, JOINT, ARTHROSCOPIC (Left)    Anesthesia: General    Operative Findings:      * Bankart lesion of left shoulder, initial encounter [S43.492A]     * Biceps tendinitis of left upper extremity [M75.22]     * Arthritis of left acromioclavicular joint [M19.012]    Estimated Blood Loss: * No values recorded between 5/11/2022  7:46 AM and 5/11/2022 10:13 AM *         Specimens:   Specimen (24h ago, onward)            None            Discharge Note    OUTCOME: Patient tolerated treatment/procedure well without complication and is now ready for discharge.    DISPOSITION: Home or Self Care    FINAL DIAGNOSIS:  Bankart lesion of left shoulder    FOLLOWUP: In clinic    DISCHARGE INSTRUCTIONS:    Discharge Procedure Orders   Diet Adult Regular     Ice to affected area     Notify your health care provider if you experience any of the following:  temperature >100.4     Notify your health care provider if you experience any of the following:  persistent nausea and vomiting or diarrhea     Notify your health care provider if you experience any of the following:  severe uncontrolled pain     Notify your health care provider if you experience any of the following:  redness, tenderness, or  signs of infection (pain, swelling, redness, odor or green/yellow discharge around incision site)     Notify your health care provider if you experience any of the following:  difficulty breathing or increased cough     Notify your health care provider if you experience any of the following:  severe persistent headache     Notify your health care provider if you experience any of the following:  worsening rash     Notify your health care provider if you experience any of the following:  persistent dizziness, light-headedness, or visual disturbances     Notify your health care provider if you experience any of the following:  increased confusion or weakness     Activity as tolerated

## 2022-05-11 NOTE — OP NOTE
[facility])  Surgery Department  Operative Note    SUMMARY     Date of Procedure: 5/11/2022     Procedure: Procedure(s) (LRB):  ARTHROSCOPY, SHOULDER, WITH BANKART REPAIR (Left)  ARTHROSCOPY,SHOULDER,WITH CAPSULORRHAPHY (Left)  REMOVAL, LOOSE BODY, JOINT, ARTHROSCOPIC (Left)     Surgeon(s) and Role:     * Zev Goldstein MD - Primary    Assisting Surgeon:   DO Efra Gamez PA-C    Pre-Operative Diagnosis: Bankart lesion of left shoulder, initial encounter [S43.492A]  Biceps tendinitis of left upper extremity [M75.22]  Arthritis of left acromioclavicular joint [M19.012]    Post-Operative Diagnosis: Post-Op Diagnosis Codes:     * Bankart lesion of left shoulder, initial encounter [S43.492A]     * Biceps tendinitis of left upper extremity [M75.22]     * Arthritis of left acromioclavicular joint [M19.012]    Anesthesia: General    Technical Procedures Used:   DATE OF PROCEDURE:  5/11/2022       PREOPERATIVE DIAGNOSES:   1. Left shoulder bony bankart tear  2. Left shoulder labral tear tear.   3. Left shoulder instability.   4. Left shoulder loose body     POSTOPERATIVE DIAGNOSES:   1. Left shoulder bony bankart tear  2. Left shoulder labral tear tear.   3. Left shoulder instability.   4. Left shoulder loose body     PROCEDURES:   1. Left shoulder arthroscopic bony bankart repair  2. Left shoulder labral repair    3. Left shoulder arthroscopic inferior capsullorrhaphy.   4. Left shoulder arthroscopic loose body removal     SURGEON: Zev Goldstein M.D.      ASSISTANTS:   DO Efra Gamez PA-C     COMPLICATIONS: None.      POSITION: Lateral decubitus.      ANESTHESIA: General endotracheal plus left upper extremity interscalene block.      COMPLICATIONS: None.      EXAMINATION UNDER ANESTHESIA: Left shoulder full range of motion, grade   3+ anterior drawer, 1+ posterior drawer, 2+ sulcus sign, which reduced to   1+ in external rotation.      ARTHROSCOPIC FINDINGS:  PATIENT:  Steven Reese  :  2017  JOEL:  2021     Medical Nutrition Therapy    Nutrition Reassessment    Steven is a 1 year old year old male who [facility] to [facility]. Steven was referred by Dr. Knox for nutrition education and counseling, accompanied by father.    Anthropometrics  Age:  1 year old male   Height / Length:  91.4 cm,   3.77 %ile,  Z score - 1.78  Weight:  12.2 kg (actual weight), 27 lbs 0 oz,  2.18 %tile, Z score -2.02  Wt Readings from Last 5 Encounters:   19 12.2 kg (27 lb) (2 %)*   04/10/19 12.5 kg (27 lb 8.9 oz) (5 %)*   19 12.4 kg (27 lb 5 oz) (5 %)*   19 12.2 kg (27 lb) (4 %)*   18 12.9 kg (28 lb 6 oz) (18 %)*     * Growth percentiles are based on CDC (Boys, 2-20 Years) data.     BMI: 14.65 kg/m^2;  13th %tile    Weight Comments: Starting around 4.5 months, Steven's weight gain plateaued and he fell from the 43rd %tile to the <1st %tile weight for age. He started on Pediasure at 2.5 years old and Steven's weight  improved from the -2.5 Z score to -1.45. Over the past 2 months it has regressed back to the -2.02 Z score.    Allergies/Intolerances     Allergies   Allergen Reactions     Nuts Anaphylaxis     Peanut and Tree Nut     Egg [Chicken-Derived Products (Egg)]         Nutrition History  Steven's father reports feeding him 4 times a day now. He still wants milk at night but Dad says no. Mom still gives in to this though. He uses the sippy or straw cups during the day. Mom will give him a bottle while he sleeps when he asks for it.     He avoids egg, peanut, and treenut due to food allergies. Labs indicated high IgE for wheat, milk, soy, and fish but no clinical symptoms have presented and so that family has been instructed to continue to offer these foods to Steven. Steven has been tolerating cheese and yogurt but if he drinks whole milk he starts to scratch his skin all over his body. They tried soy milk,    1. Anterior-inferior labral tear from 11 o'clock to 6 o'clock.   2. A patulous inferior capsule.   3. Intact superior labrum.   4. Intact rotator cuff.   5. Bony bankart 2 x 1.5 cm  6. Intact biceps tendon.   7. Loose body, 1 x 1.5 cm in inferior axillary pouch     IMPLANT USED: Arthrex 3.0mm SutureTak anchor double loaded with tape x 1, PushLock 2.9mm anchors x 4     INDICATIONS: The patient is a 31 year old male, who injured / dislocated his left shoulder and has sustained multiple dislocations since. MRI shows an anterior-inferior labral tear with a large bony bankart. After failing nonoperative treatment, the risks and benefits were discussed and he elected to proceed with operative intervention.      PROCEDURE IN DETAIL: The patient brought into the room. After undergoing general endotracheal anesthesia and a left upper extremity interscalene block, he was placed on a well-padded operating table. A beanbag positioner was used. His left upper extremity was prepped and draped in the usual sterile fashion and then the patient was placed in a lateral decubitus position with his right side up. A spider arm ye was used during the case. No more than 15 pounds of traction was used during the critical portions of the case. A sterile bump was used during the case.      The standard posterior portal and anterior superior lateral portal were created. Diagnostic arthroscopy performed. The glenohumeral joint was entered. There was no significant glenoid or humeral head chondromalacia.   There was a Hill Sachs defect on the posterior aspect of the humeral head.  Several loose cartilage flaps were removed from this base with a ring currette and biter and grasper.  No remplissage was judged to be necessary.     LOOSE BODY REMOVAL:  A Large loose body was identified in the inferior axillary pouch.  The size was 1 x 1.5 cm.  Using a grasper through a separate portal, this was removed and discarded as it was loose cartilage  almond milk, and goat milk but he didn't like them.     Father reports that Steven seems to have a good appetite and is eating much more than before. He will eat everything that is offered to him,rice, most meats, and most fruit and veggies. He prefers cold or room temperature foods, not hot. He eats soft moist foods better than tough foods. Dad feels that he doesn't chew very well. Father has incorporated more fats into his diet such as avocado, olive oil, and sausage. He now buys whole milk yogurt. He didn't try using half in half or cream yet. Steven has been drinking up to 1 bottle of Pediasure per day.     Most of the time father feeds Steven since when Steven feeds himself he makes a large mess. He takes about 45-60 minutes at meals. He drinks 4 oz of whole milk or water with his meals    He does not take any vitamin or mineral supplements.     Nutritional Intakes  8am Breakfast: 1/2 cup Cinnamon toast crunch or fruit loops, whole milk, 4 oz yoplait original yogurt and Mama Jaleel pouch (also likes sausage, atwood, bread)  12:30 Lunch: turkey or ham with cheese at grandma's house; at home would eat ground pork, congee, broccoli with ranch, 1/3 of a banana, Pediasure  4-5pm Snack: atwood on bread, cheese and crackers, chips, cheezits, yogurt, fruit, mandarin orange  8 pm Dinner: rice, broccoli, and chicken/pork/beef, or rice, breakfast sausage, spinach, chicken, butter  Beverages: 8 oz Pediasure in bottle, 4 oz whole milk 5 times daily, water (straw cup)    Estimated Calorie Intake: Average of 1100 calories per day (90 kcal/kg/day)    Pertinent Labs  Reviewed in chart    Medications/Vitamins/Minerals  Current Outpatient Medications   Medication Sig Dispense Refill     albuterol (2.5 MG/3ML) 0.083% neb solution Take 1 vial (2.5 mg) by nebulization once for 1 dose       albuterol (ACCUNEB) 1.25 MG/3ML nebulizer solution Take 1 vial (1.25 mg) by nebulization every 6 hours as needed for shortness of breath / dyspnea or  flap.  Smaller debris was removed with oscillating tabitha.      There was an anterior-inferior labral tear extending from the 11 o'clock position anteriorly to the 6 o'clock position inferiorly. The biceps tendon was brought into the joint and found to be intact. The superior labrum was intact and stable to probe. There was no evidence of sublabral foramen.  The rotator cuff was intact.      BONY BANKART REPAIR: An accessory anterior inferior medial portal was created. Then, 3 cannulas were inserted. Liberator devices were used to release the labrum and scarred in bony bankart off the medial and inferior portion of the glenoid. An oscillating shaver was used to clean up the soft tissues in this area and prepare the bony bed. Fibers of the subscapularis were visualized underneath to demonstrate adequate mobilization of the labrum and inferior capsule. The patulous inferior capsule was noted to be in need of plication as well.  A suture bridge was planned for the bony Bankart, with 1 medially placed 3.0 mm Arthrex SutureTak.  This was single loaded with one pair of FiberTape.  Through the anterior inferior portal along the medial aspect of the neck, the guide was used to drill the SutureTak hole and the 3.0 mm SutureTak was placed without difficulty medial to the bed for the bony Bankart.  Both suture limbs were shuttled away from this cannula.  A 45 degree left suture Passer was then inserted and both sutures were shuttled around the bony piece and parked outside of the cannula on the anterior inferior portal.  They were left in total later in the case to be used for a double row suture bridge technique to repair the bony Bankart.     INFERIOR CAPSULLORRAPHY  Attention was then turned to repair of the inferior labrum and inferior capsulorrhaphy.  A 90 degree to the left Passer was used to give a large capsular bite from the inferior aspect of the patulous capsule translating it onto the face of the glenoid.  A 90  wheezing (Patient not taking: Reported on 11/29/2018) 25 vial 0     cetirizine (ZYRTEC) 5 MG/5ML solution Take 2.5 mLs (2.5 mg) by mouth daily 60 mL 3     diphenhydrAMINE HCl 12.5 MG/5ML SYRP Take 12.5 mg by mouth 4 times daily as needed for allergies 237 mL 1     emollient (VANICREAM) cream Apply topically 2 times daily       EPINEPHrine (EPIPEN JR) 0.15 MG/0.3ML injection 2-pack Inject 0.3 mLs (0.15 mg) into the muscle as needed for anaphylaxis 1.2 mL 3     ferrous sulfate (PEARL-IN-SOL) 75 (15 FE) MG/ML oral drops        hydrocortisone 2.5 % ointment Apply topically 2 times daily (on the face) 30 g 3     mometasone (ELOCON) 0.1 % cream Apply sparingly to affected area twice daily as needed.  Do not apply to face. 45 g 3     mupirocin (BACTROBAN) 2 % ointment        order for DME Equipment being ordered: Nutrition supplement (pediasure liquid) 1 can twice a day 60 Can 5     order for DME Nebulizer. 1 Device 0       Estimated Nutrition Needs  Needs based on:  RDA = 102 kcal/kg  Energy:  102-110 kcal/kg/day  Protein:  1.2-2 g/kg/day  Fluid:  1100 mL/day or per MD    Nutrition Diagnosis  Inadequate energy intake related to feeding difficulties as evidenced by parent report, picky eating and aversion to food, and reliance on nutritional supplements to meet the majority of his nutritional needs.      NUTRITION STATUS VALIDATION  Two or More Data Points  -Weight loss (2-20 years of age): 5% usual body weight = mild malnutrition  -Deceleration in weight for length/height Z-score: Decline of 1 Z-score = mild malnutrition  -Inadequate nutrient intake: 51-75% estimated energy/protein needs = mild malnutrition    Patient meets criteria for mild malnutrition.      Intervention  Nutrition education: Discussed diet strategies to help with weight gain such as eating 5-6 smaller meals per day, adding high calorie ingredients to foods, and fortifying his whole milk to contain added calories. Recommended new foods to try and meal  degree to the right Passer was used from the posterior portal to give us a large capsular bite from the posterior inferior aspect of the pectoralis capsule.  This secondarily gave us a mattress type repair across the split in the inferior capsular injury.  These 2 limbs of the FiberTape which had been shuttled through those 2 passes were planned to be placed into the PushLock anchor at the 6:30 position.  The guide was put in good position and the  hole drilled.  The PushLock and the FiberTape was then inserted without difficulty which gave us excellent inferior capsular plication and repair of the inferior labral structures onto the inferior aspect of the glenoid, significant for the reducing the volume of the inferior capsule and cinching up the labrum directly adjacent to the inferior aspect of the bony Bankart.    A second Fibertape and Pushlock anchor were placed just superior to this and lower than the Fibertapes from the medial anchor.  This was placed in the same manner and further reinforced the inferior half of the repair.     LABRAL REPAIR:  Attention was then turned to repairing the anterior labrum just superior to the bony Bankart.  The 45 degree to the left Passer was used to give us a thick labral capsular bite directly superior to the bony piece.  This was then inserted with a 2.9 mm PushLock anchor directly onto the face of the glenoid in a simple fashion.  Excellent reduction of our bony Bankart piece was achieved at this point.     BONY BANKART REPAIR:  Attention was then turned back to the 2 Fiberapes from our medially placed SutureTak.  These were shuttled through our anterior inferior cannula.  Both sutures were placed into a push lock onto the face of the glenoid.   After these 4 sutures were tensioned appropriately with with our PushLock, excellent reduction and stability was achieved on our bony Bankart piece.  This appeared to be anatomically reduced.  The glenoid face appeared to be  time strategies. Discussed feeding difficulties and potential need for feeding therapy to address them.  Initiate/modify nutrition supplements: Encouraged regular use of supplements or high calorie smoothie/milk shake at home.     Goals  1. Weight gain of at least 4-10 gm/day.  2. Continue to provide 1 bottle of Pediasure (or equivalent nutritional supplement drink) daily.  3. Continue to try milk/milk alternatives in sippy cup. Add 1 Tbsp heavy cream in 4 oz whole milk 4-5 times daily. Avoid juice and sugary drinks to promote milk.  4. Continue to add a high calorie food to each meal and snack (avocado, olive oil, butter, cheese, etc). Try sunflower seed butter.  5. Continue to work on more independence in feeding. Continue to have Steven sit at the table for all his meals.   6. Add in AM snack so that he is eating 5 times a day.   7. Follow up with RD for weight check in 2 months. Consider feeding therapy evaluation to assess for feeding difficulties and improve chewing skills.    Monitoring/Evaluation  Will continue to monitor progress towards goals and provide nutrition education as needed.    Spent 45 minutes in consult with patient & father.     well covered with the labrum and well protected with our inferior and superiorly placed labral tape and PushLock anchors.      After our reconstruction was complete, traction was taken down and our shoulder had improved stability and the humeral head with well balanced on the glenoid.      Portal sites were closed with 4-0 Monocryl, covered with Mastisol, Steri-Strips, Xeroform, 4 x 4, fluffs, ABD pads and tape. The patient was placed in a neutral sling and pillow for protection, was extubated in the room, transferred to Recovery Room in stable condition accompanied by his physician. There were no complications in the case.      I was prepped and scrubbed and did perform all critical portions of case.     Postop plan for the patient is to follow the bankart repair protocol.         Zev Goldstein MD        Description of the Findings of the Procedure: above    Significant Surgical Tasks Conducted by the Assistant(s), if Applicable: none    Complications: No    Estimated Blood Loss (EBL): * No values recorded between 5/11/2022  7:46 AM and 5/11/2022 10:41 AM *           Implants:   Implant Name Type Inv. Item Serial No.  Lot No. LRB No. Used Action   ANCHOR SUTURETAK 3MM BC - [id number]  ANCHOR SUTURETAK 3MM BC  [employer] [id number] Left 1 Implanted   ANCHOR BIOCOMPOSITE 2.9X15.5MM - [id number]  ANCHOR BIOCOMPOSITE 2.9X15.5MM  [employer] [id number] Left 4 Implanted       Specimens:   Specimen (24h ago, onward)            None                  Condition: Good    Disposition: PACU - hemodynamically stable.    Attestation: I was present and scrubbed for the entire procedure.    Discharge Note    SUMMARY     Admit Date: 5/11/2022    Discharge Date and Time:  05/11/2022 12:24 PM    Hospital Course (synopsis of major diagnoses, care, treatment, and services provided during the course of the hospital stay): outpatient surgery     Final Diagnosis: Post-Op Diagnosis Codes:     * Bankart lesion of left shoulder,  initial encounter [S41.189A]     * Biceps tendinitis of left upper extremity [M75.22]     * Arthritis of left acromioclavicular joint [M19.012]    Disposition: Home or Self Care    Follow Up/Patient Instructions:     Medications:  Reconciled Home Medications:      Medication List      CONTINUE taking these medications    aspirin 81 MG Chew  Chew and swallow 1 tablet (81 mg total) by mouth once daily. for 21 days     bacitracin 500 unit/gram Oint  Apply topically 2 (two) times daily.     oxyCODONE-acetaminophen  mg per tablet  Commonly known as: PERCOCET  Take 1 tablet by mouth every 6 (six) hours as needed for Pain.     promethazine 25 MG tablet  Commonly known as: PHENERGAN  Take 1 tablet (25 mg total) by mouth every 6 (six) hours as needed for Nausea.     traMADoL 50 mg tablet  Commonly known as: ULTRAM  Take 1 tablet (50 mg total) by mouth every 6 (six) hours as needed for Pain.          Discharge Procedure Orders   Diet Adult Regular     Ice to affected area     Notify your health care provider if you experience any of the following:  temperature >100.4     Notify your health care provider if you experience any of the following:  persistent nausea and vomiting or diarrhea     Notify your health care provider if you experience any of the following:  severe uncontrolled pain     Notify your health care provider if you experience any of the following:  redness, tenderness, or signs of infection (pain, swelling, redness, odor or green/yellow discharge around incision site)     Notify your health care provider if you experience any of the following:  difficulty breathing or increased cough     Notify your health care provider if you experience any of the following:  severe persistent headache     Notify your health care provider if you experience any of the following:  worsening rash     Notify your health care provider if you experience any of the following:  persistent dizziness, light-headedness, or visual  disturbances     Notify your health care provider if you experience any of the following:  increased confusion or weakness     Activity as tolerated

## 2022-05-11 NOTE — TRANSFER OF CARE
"Anesthesia Transfer of Care Note    Patient: Volodymyr Pacheco    Procedure(s) Performed: Procedure(s) (LRB):  ARTHROSCOPY, SHOULDER, WITH BANKART REPAIR (Left)  ARTHROSCOPY,SHOULDER,WITH CAPSULORRHAPHY (Left)  REMOVAL, LOOSE BODY, JOINT, ARTHROSCOPIC (Left)    Patient location: PACU    Anesthesia Type: general and regional    Transport from OR: Transported from OR on 6-10 L/min O2 by face mask with adequate spontaneous ventilation    Post pain: adequate analgesia    Post assessment: no apparent anesthetic complications and tolerated procedure well    Post vital signs: stable    Level of consciousness: sedated    Nausea/Vomiting: no nausea/vomiting    Complications: none    Transfer of care protocol was followed      Last vitals:   Visit Vitals  BP (!) 143/72 (BP Location: Right arm, Patient Position: Lying)   Pulse 69   Temp 37.1 °C (98.7 °F) (Oral)   Resp 20   Ht 6' 5" (1.956 m)   Wt 93.4 kg (206 lb)   SpO2 97%   BMI 24.43 kg/m²     "

## 2022-05-11 NOTE — PATIENT INSTRUCTIONS
[address], [place]                                    [phone]             Postoperative Instructions for Shoulder Surgery               Your Surgery Included:   Open              Arthroscopic [] Instability Repair     [] Diagnostic   [] Rotator Cuff Repair     [] Lysis of Adhesions / Manipulation [] Distal Clavicle Resection    [] Debridement [] Biceps Tenodesis       [] Labrum  [] Rotator Cuff   [] Cartilage   [] Contracture Release    [] SLAP Repair   [] Fracture Fixation    [x] Instability Repair  [] AC Joint Reconstruction      [] Rotator Cuff Repair [] Joint Replacement     [] Subacromial Decompression / Bursectomy   [] Hemiarthroplasty  [] Total Shoulder    [] Biceps Tenotomy / Tenodesis    [] Reverse Total Shoulder       [] Distal Clavicle Resection          [] Amniox Arthrocentesis    [] Contracture Release                 Call our office ([phone]) immediately if you experience any of the following:      Excessive bleeding or pus like drainage at the incision site      Uncontrollable pain not relieved by pain medication      Excessive swelling or redness at the incision site      Fever above 101.5 degrees not controlled with Tylenol or Motrin      Shortness of Breath      Any foul odor or blistering from the surgery site   FOR EMERGENCIES: If any unusual problems or difficulties occur, call our office at [phone], or page the  at [phone] who will direct your call appropriately   1.   Pain Management: A cold therapy cuff, pain medications, local injections, and in some cases, regional anesthesia injections are used to manage your post-operative pain. The decision to use each of these options is based on their risks and benefits.    Medications: You were given one or more of the following medication prescriptions before leaving the hospital. Have the prescriptions filled at a pharmacy on your way home and follow the instructions on the bottles.  If you need a refill, please call your pharmacy.     Narcotic Medication (usually Vicodin ES, Lortab, Percocet or Nucynta): Begin taking the medication before your shoulder starts to hurt. Some patients do not like to take any medication, but if you wait until your pain is severe before taking, you will be very uncomfortable for several hours waiting for the narcotic to work. Always take with food.    Nausea / Vomiting: For this issue, we prescribe Phenergan, use this medication as directed.    Cold Therapy: You may have been sent home with a [employer] cold therapy unit and wrap for your shoulder. Fill with ice and water to the indicated fill line and use throughout the day for the first two days and then as needed to help relieve pain and control swelling.     Regional Anesthesia Injections (Blocks): You may have been given a regional nerve block either before or after surgery. This may make your entire shoulder numb for 24-36 hours.                    2.   Diet: Eat a bland diet for the first day after surgery. Progress your diet as tolerated. Constipation may occur with Narcotic usage, contact our office if you are experiencing constipation.   3.   Activity: After you arrive at home, spend most of the first 24 hours resting in bed, on the couch, or in a reclining chair. After the first 24 hours at home, slowly increase your activity level based on your symptoms.   4.   Dressing Change: Remove the dressing on the 3rd day. It is normal for some blood to be seen on the dressings. It is also normal for you to see apparent bruising on the skin around your shoulder when you remove the dressing. If present, leave the steri-strip tape across the incisions. If you are concerned by the drainage or the appearance of your shoulder, please call one of the numbers listed below.   5.   Showering: You may shower on the 3rd day after surgery with waterproof bandages over small incisions. If you have an incision with Prineo  (clear mesh), it does not need to be covered. Do not submerge in any water until after your postoperative appointment in clinic.   6.   Shoulder Sling (with/without Pillow attachment): You may have been sent home with a sling / pillow attachment holding your arm away from your body. You may remove the sling when changing clothes or bathing. Make sure to wear the sling while sleeping unless instructed otherwise. You may remove at rest or for exercises.           [x] You need to wear the sling with pillow for 24 hours a day for 6 weeks.   7.  Shoulder Exercises: Begin these exercises the first day after surgery in order to help you regain your motion and strength. You may do the following marked exercises for 2-5 mins five times a day:   [] Shoulder shrugs - Shrug your shoulders up and down.   [] Pendulums - Bend forward allowing your arm to hang down in front of you. Gently swing your arm side-to-side and front to back.                                                                                                                               [] Passive abduction - Have a family member gently lift your arm away from your body bringing your elbow up to the level of your shoulder.                                                                                                                   [] Shoulder rotation - With your arm at your side, have a family member gently rotate your arm internally and externally.                                                                                                                  [] Scapular retractions - (Squeeze shoulder blades together): Squeeze shoulder blades together while slightly pulling them down (do not shrug your shoulders upward); You can perform 10-15 reps, several times throughout the day, when seated at your desk, driving in the car, etc.                                                                                                                     []  Pulley exercises - Put a towel or long sleeve shirt over the top of a door. Stand facing the door. Use your good arm to gently pull your operative arm up in front of you.   [x] Elbow motion - Straighten and bend your elbow.                                                                                                               [x] Ball squeezes - use ball attached to sling/pillow or soft (nerf) ball for  strengthening                                                                                                                 8.  Physical Therapy: Physical therapy is an essential component to your recovery from surgery. Your physical therapy will start in 3 days.   FIRST POSTOPERATIVE VISIT: As scheduled.

## 2022-05-11 NOTE — ANESTHESIA PROCEDURE NOTES
Pec Single Injection Block(s)    Patient location during procedure: pre-op   Block not for primary anesthetic.  Reason for block: at surgeon's request and post-op pain management   Post-op Pain Location: left shoulder   Start time: 5/11/2022 6:55 AM  Timeout: 5/11/2022 6:40 AM   End time: 5/11/2022 7:00 AM    Staffing  Authorizing Provider: Harvinder Early MD  Performing Provider: Harris Cordova MD    Preanesthetic Checklist  Completed: patient identified, IV checked, site marked, risks and benefits discussed, surgical consent, monitors and equipment checked, pre-op evaluation and timeout performed  Peripheral Block  Patient position: supine  Prep: ChloraPrep  Patient monitoring: heart rate, cardiac monitor, continuous pulse ox, continuous capnometry and frequent blood pressure checks  Block type: pectoral  Laterality: left  Injection technique: single shot  Needle  Needle type: Stimuplex   Needle gauge: 21 G  Needle length: 4 in  Needle localization: anatomical landmarks and ultrasound guidance   -ultrasound image captured on disc.  Assessment  Injection assessment: negative aspiration, negative parasthesia and local visualized surrounding nerve  Paresthesia pain: none  Heart rate change: no  Slow fractionated injection: yes    Medications:    Medications: ropivacaine (NAROPIN) injection 0.5% - Perineural   10 mL - 5/11/2022 7:00:00 AM    Additional Notes  Performed PEC II  VSS.  [facility] RN monitoring vitals throughout procedure.  Patient tolerated procedure well.

## 2022-05-11 NOTE — ANESTHESIA PROCEDURE NOTES
Intubation    Date/Time: 5/11/2022 7:16 AM  Performed by: Monique Ye CRNA  Authorized by: Harvinder Early MD     Intubation:     Induction:  Intravenous    Intubated:  Postinduction    Attempts:  1    Attempted By:  CRNA    Method of Intubation:  Video laryngoscopy    Blade:  Hill 4    Laryngeal View Grade: Grade I - full view of cords      Difficult Airway Encountered?: No      Complications:  None    Airway Device:  Oral endotracheal tube    Airway Device Size:  7.5    Style/Cuff Inflation:  Cuffed (inflated to minimal occlusive pressure)    Inflation Amount (mL):  8    Tube secured:  24    Secured at:  The lips    Placement Verified By:  Capnometry    Complicating Factors:  None    Findings Post-Intubation:  BS equal bilateral and atraumatic/condition of teeth unchanged

## 2022-05-12 ENCOUNTER — TELEPHONE (OUTPATIENT)
Dept: SPORTS MEDICINE | Facility: CLINIC | Age: 32
End: 2022-05-12
Payer: COMMERCIAL

## 2022-05-12 ENCOUNTER — PATIENT MESSAGE (OUTPATIENT)
Dept: SPORTS MEDICINE | Facility: CLINIC | Age: 32
End: 2022-05-12
Payer: COMMERCIAL

## 2022-05-12 NOTE — TELEPHONE ENCOUNTER
Called patient per Triage message and call.     Patient stated he was having issues with block and was unsure if it was due to the block.     Patient stated he was improved this morning and was able to speak to the anesthesia team and his concerns were decreased.     Told patient I would provide him with the number to anesthesia in the case he has questions or concerns.     Patient stated understanding and stated he would call with other concerns.     He stated he would start the exercises and get PT scheduled at [facility] for Monday (5/16/22).

## 2022-05-12 NOTE — TELEPHONE ENCOUNTER
"  Reason for Disposition   Difficulty breathing   Major surgery in the past month    Additional Information   Negative: Sounds like a life-threatening emergency to the triager   Negative: SEVERE difficulty breathing (e.g., struggling for each breath, speaks in single words)   Negative: [1] Breathing stopped AND [2] hasn't returned   Negative: Choking on something   Negative: Bluish (or gray) lips or face now   Negative: Difficult to awaken or acting confused (e.g., disoriented, slurred speech)   Negative: Passed out (i.e., lost consciousness, collapsed and was not responding)   Negative: Wheezing started suddenly after medicine, an allergic food or bee sting   Negative: Stridor   Negative: Slow, shallow and weak breathing   Negative: Sounds like a life-threatening emergency to the triager   Negative: [1] MODERATE difficulty breathing (e.g., speaks in phrases, SOB even at rest, pulse 100-120) AND [2] NEW-onset or WORSE than normal   Negative: Wheezing can be heard across the room   Negative: Drooling or spitting out saliva (because can't swallow)   Negative: History of prior "blood clot" in leg or lungs (i.e., deep vein thrombosis, pulmonary embolism)   Negative: History of inherited increased risk of blood clots (e.g., Factor 5 Leiden, Anti-thrombin 3, Protein C or Protein S deficiency, Prothrombin mutation)    Protocols used: POST-OP SYMPTOMS AND [id number]-AA, BREATHING DIFFICULTY-A-AH    "

## 2022-05-12 NOTE — ANESTHESIA POSTPROCEDURE EVALUATION
Anesthesia Post Evaluation    Patient: Volodymyr Pacheco    Procedure(s) Performed: Procedure(s) (LRB):  ARTHROSCOPY, SHOULDER, WITH BANKART REPAIR (Left)  ARTHROSCOPY,SHOULDER,WITH CAPSULORRHAPHY (Left)  REMOVAL, LOOSE BODY, JOINT, ARTHROSCOPIC (Left)    Final Anesthesia Type: general      Patient location during evaluation: PACU  Patient participation: Yes- Able to Participate  Level of consciousness: awake and alert and oriented  Post-procedure vital signs: reviewed and stable  Pain management: adequate  Airway patency: patent    PONV status at discharge: No PONV  Anesthetic complications: no      Cardiovascular status: hemodynamically stable  Respiratory status: unassisted, spontaneous ventilation and room air  Hydration status: euvolemic  Follow-up not needed.          Vitals Value Taken Time   /59 05/11/22 1321   Temp 36.6 °C (97.9 °F) 05/11/22 1315   Pulse 69 05/11/22 1323   Resp 26 05/11/22 1323   SpO2 99 % 05/11/22 1323   Vitals shown include unvalidated device data.      Event Time   Out of Recovery 13:16:00         Pain/Martha Score: Pain Rating Prior to Med Admin: 0 (5/11/2022 10:55 AM)  Martha Score: 10 (5/11/2022  1:15 PM)

## 2022-05-12 NOTE — TELEPHONE ENCOUNTER
"Patient states he had orthopedic surgery this morning. He says he had a nerve block with a pain pump. He is c/o difficulty breathing, feels like he can't take a full breath. He answered "yes" that he is having shallow breathing but when advised to call 911 he says he is not having shallow breathing just feels like he can't get a full breath through. He says his anesthesiologist told him these symptoms may be experienced and gave him his number to call if they are but he can not locate the number. Patient advised with symptoms of difficulty breathing and recent surgery I will have to have him to go to ED now. Patient states he will not go to ED he just wants to speak to the anesthesiologist. Please contact caller directly to discuss further care advice.  "

## 2022-05-12 NOTE — ANESTHESIA POST-OP PAIN MANAGEMENT
Acute Pain Service Progress Note    05/12/2022    Called and spoke to Volodymyr Pacheco about the Nimbus pump and PNC.  Pain has been well controlled.  Denies tinnitus, metallic taste in mouth, weakness in extremity.  Denies erythema, warmth, tenderness, bleeding at site of catheter entry.  Dressing c/d/i.  All questions answered.  Encouraged him to call the provided number if any issues arise.  Will follow up.            Harris Cordova MD   Fellow  Regional Anesthesiology and Acute Pain Medicine  [facility]

## 2022-05-13 ENCOUNTER — PATIENT MESSAGE (OUTPATIENT)
Dept: SPORTS MEDICINE | Facility: CLINIC | Age: 32
End: 2022-05-13
Payer: COMMERCIAL

## 2022-05-15 NOTE — ANESTHESIA POST-OP PAIN MANAGEMENT
Acute Pain Service Progress Note    05/15/2022    I called Volodymyr Pacheco in regards to the PNC and Nimbus pump.  Patient denies any symptoms of LAST.  Dressing clean, dry and intact.  No erythema swelling at the insertion site.  Reiterated that the PNC is to be removed the following day.  I encouraged him to call should he have any questions or concerns.          Harris Cordova MD   Fellow  Regional Anesthesiology and Acute Pain Medicine  [facility]

## 2022-05-23 ENCOUNTER — OFFICE VISIT (OUTPATIENT)
Dept: SPORTS MEDICINE | Facility: CLINIC | Age: 32
End: 2022-05-23
Payer: COMMERCIAL

## 2022-05-23 VITALS
HEART RATE: 76 BPM | WEIGHT: 214.5 LBS | BODY MASS INDEX: 25.33 KG/M2 | HEIGHT: 77 IN | SYSTOLIC BLOOD PRESSURE: 128 MMHG | DIASTOLIC BLOOD PRESSURE: 81 MMHG

## 2022-05-23 DIAGNOSIS — Z09 SURGERY FOLLOW-UP EXAMINATION: ICD-10-CM

## 2022-05-23 DIAGNOSIS — G89.18 ACUTE POSTOPERATIVE PAIN OF LEFT SHOULDER: Primary | ICD-10-CM

## 2022-05-23 DIAGNOSIS — M25.512 ACUTE POSTOPERATIVE PAIN OF LEFT SHOULDER: Primary | ICD-10-CM

## 2022-05-23 DIAGNOSIS — Z98.890 S/P SURGERY FOR RECURRENT DISLOCATION OF SHOULDER: ICD-10-CM

## 2022-05-23 PROCEDURE — 99999 PR PBB SHADOW E&M-EST. PATIENT-LVL III: ICD-10-PCS | Mod: PBBFAC,,, | Performed by: PHYSICIAN ASSISTANT

## 2022-05-23 PROCEDURE — 1159F MED LIST DOCD IN RCRD: CPT | Mod: CPTII,S$GLB,, | Performed by: PHYSICIAN ASSISTANT

## 2022-05-23 PROCEDURE — 3079F PR MOST RECENT DIASTOLIC BLOOD PRESSURE 80-89 MM HG: ICD-10-PCS | Mod: CPTII,S$GLB,, | Performed by: PHYSICIAN ASSISTANT

## 2022-05-23 PROCEDURE — 3074F SYST BP LT 130 MM HG: CPT | Mod: CPTII,S$GLB,, | Performed by: PHYSICIAN ASSISTANT

## 2022-05-23 PROCEDURE — 3008F PR BODY MASS INDEX (BMI) DOCUMENTED: ICD-10-PCS | Mod: CPTII,S$GLB,, | Performed by: PHYSICIAN ASSISTANT

## 2022-05-23 PROCEDURE — 99024 PR POST-OP FOLLOW-UP VISIT: ICD-10-PCS | Mod: S$GLB,,, | Performed by: PHYSICIAN ASSISTANT

## 2022-05-23 PROCEDURE — 3008F BODY MASS INDEX DOCD: CPT | Mod: CPTII,S$GLB,, | Performed by: PHYSICIAN ASSISTANT

## 2022-05-23 PROCEDURE — 3079F DIAST BP 80-89 MM HG: CPT | Mod: CPTII,S$GLB,, | Performed by: PHYSICIAN ASSISTANT

## 2022-05-23 PROCEDURE — 99024 POSTOP FOLLOW-UP VISIT: CPT | Mod: S$GLB,,, | Performed by: PHYSICIAN ASSISTANT

## 2022-05-23 PROCEDURE — 3074F PR MOST RECENT SYSTOLIC BLOOD PRESSURE < 130 MM HG: ICD-10-PCS | Mod: CPTII,S$GLB,, | Performed by: PHYSICIAN ASSISTANT

## 2022-05-23 PROCEDURE — 99999 PR PBB SHADOW E&M-EST. PATIENT-LVL III: CPT | Mod: PBBFAC,,, | Performed by: PHYSICIAN ASSISTANT

## 2022-05-23 PROCEDURE — 1159F PR MEDICATION LIST DOCUMENTED IN MEDICAL RECORD: ICD-10-PCS | Mod: CPTII,S$GLB,, | Performed by: PHYSICIAN ASSISTANT

## 2022-05-23 NOTE — PROGRESS NOTES
"CC: Left shoulder post op 2 weeks    Patient is here for his 2 week post op appointment s/p below and is doing well. Patient is doing PT at [facility] and is progressing as expected. Patient is no longer taking pain medication. he denies any chest pain, SOB, fevers, chills, nausea, vomiting, or drainage from incision sites.     DATE OF PROCEDURE:  5/11/2022       PREOPERATIVE DIAGNOSES:   1. Left shoulder bony bankart tear  2. Left shoulder labral tear tear.   3. Left shoulder instability.   4. Left shoulder loose body     POSTOPERATIVE DIAGNOSES:   1. Left shoulder bony bankart tear  2. Left shoulder labral tear tear.   3. Left shoulder instability.   4. Left shoulder loose body     PROCEDURES:   1. Left shoulder arthroscopic bony bankart repair  2. Left shoulder labral repair    3. Left shoulder arthroscopic inferior capsullorrhaphy.   4. Left shoulder arthroscopic loose body removal     SURGEON: Zev Goldstein M.D.    Pain well tolerated on pain medication  Sling in place  No issues reported    Review of Systems   Constitution: Negative. Negative for chills, fever and night sweats.    Cardiovascular: Negative for chest pain and syncope.   Respiratory: Negative for cough and shortness of breath.    Gastrointestinal: Negative for abdominal pain and bowel incontinence.      PE:    /81   Pulse 76   Ht 6' 5" (1.956 m)   Wt 97.3 kg (214 lb 8.1 oz)   BMI 25.44 kg/m²      Left shoulder    Incision healed  No sign of infection  Swelling resolved  Compartments soft  Neurovascular status intact in extremity    PROM  Forward elevatio 75 degrees  External rotation 15 degrees     Assessment:  2 weeks s/p left shoulder arthroscopic bony Bankart repair, labral repair, inferior capsulorrhaphy, loose body removal    Plan:  1. Continue PT   2. Pain medication as needed for PT; try to wean off for next visit  3. Return to clinic in 4 weeks for 6 week post op follow up    All questions were answered. " Instructed patient to call with questions or concerns in the interim.       Medical Dictation software was used during the dictation of portions or the entirety of this medical record.  Phonetic or grammatic errors may exist due to the use of this software. For clarification, refer to the author of the document.

## 2022-05-25 ENCOUNTER — PATIENT MESSAGE (OUTPATIENT)
Dept: SPORTS MEDICINE | Facility: CLINIC | Age: 32
End: 2022-05-25
Payer: COMMERCIAL

## 2022-05-31 ENCOUNTER — PATIENT MESSAGE (OUTPATIENT)
Dept: SPORTS MEDICINE | Facility: CLINIC | Age: 32
End: 2022-05-31
Payer: COMMERCIAL

## 2022-05-31 ENCOUNTER — TELEPHONE (OUTPATIENT)
Dept: SPORTS MEDICINE | Facility: CLINIC | Age: 32
End: 2022-05-31
Payer: COMMERCIAL

## 2022-05-31 DIAGNOSIS — Z98.890 S/P SURGERY FOR RECURRENT DISLOCATION OF SHOULDER: ICD-10-CM

## 2022-05-31 DIAGNOSIS — G89.18 ACUTE POSTOPERATIVE PAIN OF LEFT SHOULDER: Primary | ICD-10-CM

## 2022-05-31 DIAGNOSIS — M25.512 ACUTE POSTOPERATIVE PAIN OF LEFT SHOULDER: Primary | ICD-10-CM

## 2022-06-10 ENCOUNTER — PATIENT MESSAGE (OUTPATIENT)
Dept: SPORTS MEDICINE | Facility: CLINIC | Age: 32
End: 2022-06-10
Payer: COMMERCIAL

## 2022-06-21 ENCOUNTER — OFFICE VISIT (OUTPATIENT)
Dept: SPORTS MEDICINE | Facility: CLINIC | Age: 32
End: 2022-06-21
Payer: COMMERCIAL

## 2022-06-21 ENCOUNTER — HOSPITAL ENCOUNTER (OUTPATIENT)
Dept: RADIOLOGY | Facility: HOSPITAL | Age: 32
Discharge: HOME | End: 2022-06-21
Attending: ORTHOPAEDIC SURGERY
Payer: COMMERCIAL

## 2022-06-21 VITALS
WEIGHT: 206 LBS | HEART RATE: 81 BPM | BODY MASS INDEX: 24.32 KG/M2 | HEIGHT: 77 IN | SYSTOLIC BLOOD PRESSURE: 130 MMHG | DIASTOLIC BLOOD PRESSURE: 89 MMHG

## 2022-06-21 DIAGNOSIS — Z98.890 S/P SURGERY FOR RECURRENT DISLOCATION OF SHOULDER: ICD-10-CM

## 2022-06-21 DIAGNOSIS — G89.18 ACUTE POSTOPERATIVE PAIN OF LEFT SHOULDER: Primary | ICD-10-CM

## 2022-06-21 DIAGNOSIS — M25.512 ACUTE POSTOPERATIVE PAIN OF LEFT SHOULDER: ICD-10-CM

## 2022-06-21 DIAGNOSIS — M25.512 ACUTE POSTOPERATIVE PAIN OF LEFT SHOULDER: Primary | ICD-10-CM

## 2022-06-21 DIAGNOSIS — G89.18 ACUTE POSTOPERATIVE PAIN OF LEFT SHOULDER: ICD-10-CM

## 2022-06-21 PROCEDURE — 73030 X-RAY EXAM OF SHOULDER: CPT | Mod: TC,LT

## 2022-06-21 PROCEDURE — 99024 PR POST-OP FOLLOW-UP VISIT: ICD-10-PCS | Mod: S$GLB,,, | Performed by: ORTHOPAEDIC SURGERY

## 2022-06-21 PROCEDURE — 1159F PR MEDICATION LIST DOCUMENTED IN MEDICAL RECORD: ICD-10-PCS | Mod: CPTII,S$GLB,, | Performed by: ORTHOPAEDIC SURGERY

## 2022-06-21 PROCEDURE — 73030 XR SHOULDER COMPLETE 2 OR MORE VIEWS LEFT: ICD-10-PCS | Mod: 26,LT,, | Performed by: RADIOLOGY

## 2022-06-21 PROCEDURE — 3075F SYST BP GE 130 - 139MM HG: CPT | Mod: CPTII,S$GLB,, | Performed by: ORTHOPAEDIC SURGERY

## 2022-06-21 PROCEDURE — 3075F PR MOST RECENT SYSTOLIC BLOOD PRESS GE 130-139MM HG: ICD-10-PCS | Mod: CPTII,S$GLB,, | Performed by: ORTHOPAEDIC SURGERY

## 2022-06-21 PROCEDURE — 3079F PR MOST RECENT DIASTOLIC BLOOD PRESSURE 80-89 MM HG: ICD-10-PCS | Mod: CPTII,S$GLB,, | Performed by: ORTHOPAEDIC SURGERY

## 2022-06-21 PROCEDURE — 3008F BODY MASS INDEX DOCD: CPT | Mod: CPTII,S$GLB,, | Performed by: ORTHOPAEDIC SURGERY

## 2022-06-21 PROCEDURE — 99999 PR PBB SHADOW E&M-EST. PATIENT-LVL III: ICD-10-PCS | Mod: PBBFAC,,, | Performed by: ORTHOPAEDIC SURGERY

## 2022-06-21 PROCEDURE — 3008F PR BODY MASS INDEX (BMI) DOCUMENTED: ICD-10-PCS | Mod: CPTII,S$GLB,, | Performed by: ORTHOPAEDIC SURGERY

## 2022-06-21 PROCEDURE — 99024 POSTOP FOLLOW-UP VISIT: CPT | Mod: S$GLB,,, | Performed by: ORTHOPAEDIC SURGERY

## 2022-06-21 PROCEDURE — 73030 X-RAY EXAM OF SHOULDER: CPT | Mod: 26,LT,, | Performed by: RADIOLOGY

## 2022-06-21 PROCEDURE — 3079F DIAST BP 80-89 MM HG: CPT | Mod: CPTII,S$GLB,, | Performed by: ORTHOPAEDIC SURGERY

## 2022-06-21 PROCEDURE — 1159F MED LIST DOCD IN RCRD: CPT | Mod: CPTII,S$GLB,, | Performed by: ORTHOPAEDIC SURGERY

## 2022-06-21 PROCEDURE — 99999 PR PBB SHADOW E&M-EST. PATIENT-LVL III: CPT | Mod: PBBFAC,,, | Performed by: ORTHOPAEDIC SURGERY

## 2022-06-21 NOTE — PROGRESS NOTES
"CC: Left shoulder post op 6 weeks    Patient is here for his 6 week post op appointment s/p below and is doing well. Patient is doing PT at [facility] and is progressing as expected. Patient is no longer taking pain medication. he denies any chest pain, SOB, fevers, chills, nausea, vomiting, or drainage from incision sites.     DATE OF PROCEDURE:  5/11/2022       PREOPERATIVE DIAGNOSES:   1. Left shoulder bony bankart tear  2. Left shoulder labral tear tear.   3. Left shoulder instability.   4. Left shoulder loose body     POSTOPERATIVE DIAGNOSES:   1. Left shoulder bony bankart tear  2. Left shoulder labral tear tear.   3. Left shoulder instability.   4. Left shoulder loose body     PROCEDURES:   1. Left shoulder arthroscopic bony bankart repair  2. Left shoulder labral repair    3. Left shoulder arthroscopic inferior capsullorrhaphy.   4. Left shoulder arthroscopic loose body removal     SURGEON: Zev Goldstein M.D.    Pain well tolerated on pain medication  Sling in place  No issues reported    Review of Systems   Constitution: Negative. Negative for chills, fever and night sweats.    Cardiovascular: Negative for chest pain and syncope.   Respiratory: Negative for cough and shortness of breath.    Gastrointestinal: Negative for abdominal pain and bowel incontinence.      PE:    /89   Pulse 81   Ht 6' 5" (1.956 m)   Wt 93.4 kg (206 lb)   BMI 24.43 kg/m²      Left shoulder    Incision healed  No sign of infection  Swelling resolved  Compartments soft  Neurovascular status intact in extremity    PROM  Forward elevatio 75 degrees  External rotation 15 degrees     Assessment:  6 weeks s/p left shoulder arthroscopic bony Bankart repair, labral repair, inferior capsulorrhaphy, loose body removal    Plan:  1. Continue PT   2.Discontinue Sling  3. F/u in 6 weeks for 3 month post op     All questions were answered. Instructed patient to call with questions or concerns in the interim.             "

## 2022-07-27 ENCOUNTER — PATIENT MESSAGE (OUTPATIENT)
Dept: SPORTS MEDICINE | Facility: CLINIC | Age: 32
End: 2022-07-27
Payer: COMMERCIAL

## 2022-07-27 DIAGNOSIS — Z98.890 S/P SURGERY FOR RECURRENT DISLOCATION OF SHOULDER: Primary | ICD-10-CM

## 2022-08-21 ENCOUNTER — PATIENT MESSAGE (OUTPATIENT)
Dept: SPORTS MEDICINE | Facility: CLINIC | Age: 32
End: 2022-08-21
Payer: COMMERCIAL

## 2022-08-26 ENCOUNTER — PATIENT MESSAGE (OUTPATIENT)
Dept: SPORTS MEDICINE | Facility: CLINIC | Age: 32
End: 2022-08-26

## 2022-08-26 ENCOUNTER — OFFICE VISIT (OUTPATIENT)
Dept: SPORTS MEDICINE | Facility: CLINIC | Age: 32
End: 2022-08-26
Payer: COMMERCIAL

## 2022-08-26 VITALS
DIASTOLIC BLOOD PRESSURE: 87 MMHG | HEART RATE: 106 BPM | SYSTOLIC BLOOD PRESSURE: 138 MMHG | BODY MASS INDEX: 25.15 KG/M2 | WEIGHT: 213 LBS | HEIGHT: 77 IN

## 2022-08-26 DIAGNOSIS — Z98.890 S/P SURGERY FOR RECURRENT DISLOCATION OF SHOULDER: Primary | ICD-10-CM

## 2022-08-26 PROCEDURE — 3008F BODY MASS INDEX DOCD: CPT | Mod: CPTII,S$GLB,, | Performed by: ORTHOPAEDIC SURGERY

## 2022-08-26 PROCEDURE — 3075F PR MOST RECENT SYSTOLIC BLOOD PRESS GE 130-139MM HG: ICD-10-PCS | Mod: CPTII,S$GLB,, | Performed by: ORTHOPAEDIC SURGERY

## 2022-08-26 PROCEDURE — 3075F SYST BP GE 130 - 139MM HG: CPT | Mod: CPTII,S$GLB,, | Performed by: ORTHOPAEDIC SURGERY

## 2022-08-26 PROCEDURE — 3079F DIAST BP 80-89 MM HG: CPT | Mod: CPTII,S$GLB,, | Performed by: ORTHOPAEDIC SURGERY

## 2022-08-26 PROCEDURE — 99999 PR PBB SHADOW E&M-EST. PATIENT-LVL III: CPT | Mod: PBBFAC,,, | Performed by: ORTHOPAEDIC SURGERY

## 2022-08-26 PROCEDURE — 3079F PR MOST RECENT DIASTOLIC BLOOD PRESSURE 80-89 MM HG: ICD-10-PCS | Mod: CPTII,S$GLB,, | Performed by: ORTHOPAEDIC SURGERY

## 2022-08-26 PROCEDURE — 3008F PR BODY MASS INDEX (BMI) DOCUMENTED: ICD-10-PCS | Mod: CPTII,S$GLB,, | Performed by: ORTHOPAEDIC SURGERY

## 2022-08-26 PROCEDURE — 99214 PR OFFICE/OUTPT VISIT, EST, LEVL IV, 30-39 MIN: ICD-10-PCS | Mod: S$GLB,,, | Performed by: ORTHOPAEDIC SURGERY

## 2022-08-26 PROCEDURE — 1159F MED LIST DOCD IN RCRD: CPT | Mod: CPTII,S$GLB,, | Performed by: ORTHOPAEDIC SURGERY

## 2022-08-26 PROCEDURE — 1159F PR MEDICATION LIST DOCUMENTED IN MEDICAL RECORD: ICD-10-PCS | Mod: CPTII,S$GLB,, | Performed by: ORTHOPAEDIC SURGERY

## 2022-08-26 PROCEDURE — 99214 OFFICE O/P EST MOD 30 MIN: CPT | Mod: S$GLB,,, | Performed by: ORTHOPAEDIC SURGERY

## 2022-08-26 PROCEDURE — 99999 PR PBB SHADOW E&M-EST. PATIENT-LVL III: ICD-10-PCS | Mod: PBBFAC,,, | Performed by: ORTHOPAEDIC SURGERY

## 2022-08-26 NOTE — PROGRESS NOTES
CC: Left shoulder post op 3 months    Patient is here for his 3 months post op appointment s/p below and is doing well. Patient is doing PT at [facility] and is progressing as expected. Patient is no longer taking pain medication. he denies any chest pain, SOB, fevers, chills, nausea, vomiting, or drainage from incision sites.     DATE OF PROCEDURE:  5/11/2022       PREOPERATIVE DIAGNOSES:   1. Left shoulder bony bankart tear  2. Left shoulder labral tear tear.   3. Left shoulder instability.   4. Left shoulder loose body     POSTOPERATIVE DIAGNOSES:   1. Left shoulder bony bankart tear  2. Left shoulder labral tear tear.   3. Left shoulder instability.   4. Left shoulder loose body     PROCEDURES:   1. Left shoulder arthroscopic bony bankart repair  2. Left shoulder labral repair    3. Left shoulder arthroscopic inferior capsullorrhaphy.   4. Left shoulder arthroscopic loose body removal     SURGEON: Zev Goldstein M.D.    Pain well tolerated on pain medication  Sling in place  No issues reported    Review of Systems   Constitution: Negative. Negative for chills, fever and night sweats.    Cardiovascular: Negative for chest pain and syncope.   Respiratory: Negative for cough and shortness of breath.    Gastrointestinal: Negative for abdominal pain and bowel incontinence.      PE:    There were no vitals taken for this visit.     Left shoulder    Incision healed  No sign of infection  Swelling resolved  Compartments soft  Neurovascular status intact in extremity    PROM  Forward elevatio 135 degrees  External rotation 45 degrees     Assessment:  3 months s/p left shoulder arthroscopic bony Bankart repair, labral repair, inferior capsulorrhaphy, loose body removal    Plan:  1. Continue PT    2. F/u PRN.   All questions were answered. Instructed patient to call with questions or concerns in the interim.

## 2022-09-22 ENCOUNTER — PATIENT MESSAGE (OUTPATIENT)
Dept: OTOLARYNGOLOGY | Facility: CLINIC | Age: 32
End: 2022-09-22
Payer: COMMERCIAL

## 2022-09-23 NOTE — TELEPHONE ENCOUNTER
Marylin Helm,    Sorry to hear the problem has come back. I do agree we removed them with a sizable margin over a year ago but it is still possible for viral warts to either come back or for new warts to develop.     I know you have access to the pathology report, but it does confirm a benign skin lesion most consistent with a wart with history or cryotherapy. Since the wart came back (or a new one developed) after cryotherapy and surgical excision, it may be worth checking with a dermatologist in your area to see if topical immunotherapy is an option. This is not first-line treatment but may be an option now for this area. Please let me know if you need anything.     Harvinder Farooq

## (undated) DEVICE — BNDG COFLEX FOAM LF2 ST 6X5YD

## (undated) DEVICE — CANNULA ARTHRO TWST 6.00MMX7CM

## (undated) DEVICE — SUT MONOCRYL 4-0 PS-2

## (undated) DEVICE — SEE MEDLINE ITEM 157166

## (undated) DEVICE — TUBING SUC UNIV W/CONN 12FT

## (undated) DEVICE — SUT VICRYL PLUS 3-0 SH 18IN

## (undated) DEVICE — DRAPE STERI INSTRUMENT 1018

## (undated) DEVICE — SOL 9P NACL IRR PIC IL

## (undated) DEVICE — SUPPORT SLING SHOT II MEDIUM

## (undated) DEVICE — Device

## (undated) DEVICE — ADHESIVE DERMABOND ADVANCED

## (undated) DEVICE — SYS LABEL CORRECT MED

## (undated) DEVICE — KIT SHOULDER POSITIONER SPIDER

## (undated) DEVICE — CANNULA TWIST IN 7MM X 7CM

## (undated) DEVICE — KIT PERC INSERATION SUTURETAK

## (undated) DEVICE — DRAPE SURG W/TWL 17 5/8X23

## (undated) DEVICE — SOL IRR NACL .9% 3000ML

## (undated) DEVICE — KIT TRIMANO CHIN

## (undated) DEVICE — APPLICATOR CHLORAPREP ORN 26ML

## (undated) DEVICE — KIT FIXATION PERC ARTHSCP 2.9

## (undated) DEVICE — PASSER SUTURE LASSO L CURVE 45

## (undated) DEVICE — DRESSING AQUACEL AG FOAM 4X4

## (undated) DEVICE — BLADE SHAVER LANZA 4.2X13CM

## (undated) DEVICE — SUT MCRYL PLUS 4-0 PS2 27IN

## (undated) DEVICE — GOWN SMARTGOWN LVL4 X-LONG XL

## (undated) DEVICE — CLOSURE SKIN STERI STRIP 1/2X4

## (undated) DEVICE — BURR OVAL CUTTING 6 MM

## (undated) DEVICE — UNDERGLOVES BIOGEL PI SIZE 8

## (undated) DEVICE — SEE MEDLINE ITEM 157150

## (undated) DEVICE — ADHESIVE MASTISOL VIAL 48/BX

## (undated) DEVICE — SUT LABRALTAPE 1.5X36 BL/WH

## (undated) DEVICE — ELECTRODE 90 DEGREE ANGLE

## (undated) DEVICE — SEE MEDLINE ITEM 157160

## (undated) DEVICE — SHAVER SYS 5.5 ULRAFRR

## (undated) DEVICE — GLOVE BIOGEL SKINSENSE PI 8.0

## (undated) DEVICE — ELECTRODE REM PLYHSV RETURN 9

## (undated) DEVICE — TUBE SET INFLOW/OUTFLOW

## (undated) DEVICE — PAD SHOULDER CARE POLAR

## (undated) DEVICE — DRAPE STERI-DRAPE 1000 17X11IN

## (undated) DEVICE — SLING SHOT II LARGE

## (undated) DEVICE — PAD ELECTRODE STER 1.5X3

## (undated) DEVICE — DRAPE STERI U-SHAPED 47X51IN

## (undated) DEVICE — DRESSING AQUACEL AG SILVER 4X4

## (undated) DEVICE — PASSER SUTURELASSO STR SD 90D

## (undated) DEVICE — POSITIONER IV ARMBOARD FOAM

## (undated) DEVICE — SUT LASSO 90DEG CURVE LT